# Patient Record
Sex: FEMALE | Race: WHITE | Employment: FULL TIME | URBAN - METROPOLITAN AREA
[De-identification: names, ages, dates, MRNs, and addresses within clinical notes are randomized per-mention and may not be internally consistent; named-entity substitution may affect disease eponyms.]

---

## 2017-07-18 PROBLEM — E03.9 HYPOTHYROIDISM: Status: ACTIVE | Noted: 2017-07-18

## 2018-07-06 ENCOUNTER — TELEPHONE (OUTPATIENT)
Dept: FAMILY MEDICINE CLINIC | Age: 48
End: 2018-07-06

## 2020-01-02 PROBLEM — Z98.890 POST-OPERATIVE STATE: Status: ACTIVE | Noted: 2020-01-02

## 2020-01-09 PROBLEM — Z48.89 POSTOPERATIVE VISIT: Status: ACTIVE | Noted: 2020-01-09

## 2020-02-01 PROBLEM — Z98.890 POST-OPERATIVE STATE: Status: RESOLVED | Noted: 2020-01-02 | Resolved: 2020-02-01

## 2020-02-04 PROBLEM — Z48.89 POSTOPERATIVE VISIT: Status: RESOLVED | Noted: 2020-01-09 | Resolved: 2020-02-04

## 2021-01-04 PROBLEM — G47.9 SLEEP TROUBLE: Status: ACTIVE | Noted: 2021-01-04

## 2022-08-16 PROBLEM — K58.9 IRRITABLE BOWEL SYNDROME: Status: ACTIVE | Noted: 2022-08-16

## 2022-11-03 ENCOUNTER — HOSPITAL ENCOUNTER (OUTPATIENT)
Dept: CT IMAGING | Facility: CLINIC | Age: 52
Discharge: HOME | End: 2022-11-05
Payer: COMMERCIAL

## 2022-11-03 ENCOUNTER — OFFICE VISIT (OUTPATIENT)
Dept: FAMILY MEDICINE CLINIC | Age: 52
End: 2022-11-03
Payer: COMMERCIAL

## 2022-11-03 ENCOUNTER — HOSPITAL ENCOUNTER (OUTPATIENT)
Facility: CLINIC | Age: 52
Discharge: HOME | End: 2022-11-03
Payer: COMMERCIAL

## 2022-11-03 VITALS
TEMPERATURE: 97.3 F | BODY MASS INDEX: 29.05 KG/M2 | WEIGHT: 164 LBS | OXYGEN SATURATION: 98 % | HEART RATE: 80 BPM | SYSTOLIC BLOOD PRESSURE: 122 MMHG | DIASTOLIC BLOOD PRESSURE: 80 MMHG

## 2022-11-03 DIAGNOSIS — R19.7 DIARRHEA, UNSPECIFIED TYPE: ICD-10-CM

## 2022-11-03 DIAGNOSIS — R10.32 LLQ PAIN: ICD-10-CM

## 2022-11-03 DIAGNOSIS — N20.0 KIDNEY STONE ON RIGHT SIDE: Primary | ICD-10-CM

## 2022-11-03 DIAGNOSIS — N20.0 KIDNEY STONE ON RIGHT SIDE: ICD-10-CM

## 2022-11-03 DIAGNOSIS — E03.9 HYPOTHYROIDISM, UNSPECIFIED TYPE: ICD-10-CM

## 2022-11-03 DIAGNOSIS — R10.32 LLQ PAIN: Primary | ICD-10-CM

## 2022-11-03 LAB
ABSOLUTE EOS #: 0 K/UL (ref 0–0.4)
ABSOLUTE LYMPH #: 1.1 K/UL (ref 1–4.8)
ABSOLUTE MONO #: 0.5 K/UL (ref 0.1–1.2)
ALBUMIN SERPL-MCNC: 5.2 G/DL (ref 3.5–5.2)
ALBUMIN/GLOBULIN RATIO: 2.1 (ref 1–2.5)
ALP BLD-CCNC: 57 U/L (ref 35–104)
ALT SERPL-CCNC: 15 U/L (ref 5–33)
ANION GAP SERPL CALCULATED.3IONS-SCNC: 12 MMOL/L (ref 9–17)
AST SERPL-CCNC: 25 U/L
BASOPHILS # BLD: 1 % (ref 0–2)
BASOPHILS ABSOLUTE: 0 K/UL (ref 0–0.2)
BILIRUB SERPL-MCNC: 0.5 MG/DL (ref 0.3–1.2)
BILIRUBIN URINE: NEGATIVE
BUN BLDV-MCNC: 10 MG/DL (ref 6–20)
CALCIUM SERPL-MCNC: 9.8 MG/DL (ref 8.6–10.4)
CHLORIDE BLD-SCNC: 99 MMOL/L (ref 98–107)
CO2: 27 MMOL/L (ref 20–31)
COLOR: YELLOW
CREAT SERPL-MCNC: 0.8 MG/DL (ref 0.5–0.9)
EOSINOPHILS RELATIVE PERCENT: 1 % (ref 1–4)
EPITHELIAL CELLS UA: ABNORMAL /HPF (ref 0–5)
GFR SERPL CREATININE-BSD FRML MDRD: >60 ML/MIN/1.73M2
GLUCOSE BLD-MCNC: 93 MG/DL (ref 70–99)
GLUCOSE URINE: NEGATIVE
HCT VFR BLD CALC: 41.6 % (ref 36–46)
HEMOGLOBIN: 14.2 G/DL (ref 12–16)
KETONES, URINE: ABNORMAL
LACTIC ACID: 1.1 MMOL/L (ref 0.5–2.2)
LEUKOCYTE ESTERASE, URINE: NEGATIVE
LYMPHOCYTES # BLD: 19 % (ref 24–44)
MCH RBC QN AUTO: 32.5 PG (ref 26–34)
MCHC RBC AUTO-ENTMCNC: 34.1 G/DL (ref 31–37)
MCV RBC AUTO: 95.3 FL (ref 80–100)
MONOCYTES # BLD: 9 % (ref 2–11)
NITRITE, URINE: NEGATIVE
PDW BLD-RTO: 12.3 % (ref 12.5–15.4)
PH UA: 8 (ref 5–8)
PLATELET # BLD: 308 K/UL (ref 140–450)
PMV BLD AUTO: 8.8 FL (ref 6–12)
POTASSIUM SERPL-SCNC: 4.4 MMOL/L (ref 3.7–5.3)
PROTEIN UA: NEGATIVE
RBC # BLD: 4.37 M/UL (ref 4–5.2)
RBC UA: ABNORMAL /HPF (ref 0–4)
SEG NEUTROPHILS: 70 % (ref 36–66)
SEGMENTED NEUTROPHILS ABSOLUTE COUNT: 4 K/UL (ref 1.8–7.7)
SODIUM BLD-SCNC: 138 MMOL/L (ref 135–144)
SPECIFIC GRAVITY UA: 1.06 (ref 1–1.03)
TOTAL PROTEIN: 7.7 G/DL (ref 6.4–8.3)
TSH SERPL DL<=0.05 MIU/L-ACNC: 2.99 UIU/ML (ref 0.3–5)
TURBIDITY: CLEAR
URINE HGB: NEGATIVE
UROBILINOGEN, URINE: NORMAL
WBC # BLD: 5.7 K/UL (ref 3.5–11)
WBC UA: ABNORMAL /HPF (ref 0–5)

## 2022-11-03 PROCEDURE — 80053 COMPREHEN METABOLIC PANEL: CPT

## 2022-11-03 PROCEDURE — 3074F SYST BP LT 130 MM HG: CPT

## 2022-11-03 PROCEDURE — 84443 ASSAY THYROID STIM HORMONE: CPT

## 2022-11-03 PROCEDURE — G8417 CALC BMI ABV UP PARAM F/U: HCPCS

## 2022-11-03 PROCEDURE — 6360000004 HC RX CONTRAST MEDICATION

## 2022-11-03 PROCEDURE — 83605 ASSAY OF LACTIC ACID: CPT

## 2022-11-03 PROCEDURE — 87086 URINE CULTURE/COLONY COUNT: CPT

## 2022-11-03 PROCEDURE — 3078F DIAST BP <80 MM HG: CPT

## 2022-11-03 PROCEDURE — 1036F TOBACCO NON-USER: CPT

## 2022-11-03 PROCEDURE — 85025 COMPLETE CBC W/AUTO DIFF WBC: CPT

## 2022-11-03 PROCEDURE — G8484 FLU IMMUNIZE NO ADMIN: HCPCS

## 2022-11-03 PROCEDURE — 36415 COLL VENOUS BLD VENIPUNCTURE: CPT

## 2022-11-03 PROCEDURE — 81001 URINALYSIS AUTO W/SCOPE: CPT

## 2022-11-03 PROCEDURE — 99213 OFFICE O/P EST LOW 20 MIN: CPT

## 2022-11-03 PROCEDURE — 74177 CT ABD & PELVIS W/CONTRAST: CPT

## 2022-11-03 PROCEDURE — G8427 DOCREV CUR MEDS BY ELIG CLIN: HCPCS

## 2022-11-03 PROCEDURE — 3017F COLORECTAL CA SCREEN DOC REV: CPT

## 2022-11-03 PROCEDURE — 2580000003 HC RX 258

## 2022-11-03 RX ORDER — TAMSULOSIN HYDROCHLORIDE 0.4 MG/1
0.4 CAPSULE ORAL DAILY
Qty: 30 CAPSULE | Refills: 0 | Status: SHIPPED | OUTPATIENT
Start: 2022-11-03

## 2022-11-03 RX ORDER — 0.9 % SODIUM CHLORIDE 0.9 %
80 INTRAVENOUS SOLUTION INTRAVENOUS ONCE
Status: COMPLETED | OUTPATIENT
Start: 2022-11-03 | End: 2022-11-03

## 2022-11-03 RX ORDER — SODIUM CHLORIDE 0.9 % (FLUSH) 0.9 %
10 SYRINGE (ML) INJECTION PRN
Status: DISCONTINUED | OUTPATIENT
Start: 2022-11-03 | End: 2022-11-06 | Stop reason: HOSPADM

## 2022-11-03 RX ADMIN — IOPAMIDOL 75 ML: 755 INJECTION, SOLUTION INTRAVENOUS at 12:07

## 2022-11-03 RX ADMIN — SODIUM CHLORIDE 75 ML: 9 INJECTION, SOLUTION INTRAVENOUS at 12:07

## 2022-11-03 RX ADMIN — SODIUM CHLORIDE, PRESERVATIVE FREE 10 ML: 5 INJECTION INTRAVENOUS at 12:07

## 2022-11-03 SDOH — ECONOMIC STABILITY: TRANSPORTATION INSECURITY
IN THE PAST 12 MONTHS, HAS THE LACK OF TRANSPORTATION KEPT YOU FROM MEDICAL APPOINTMENTS OR FROM GETTING MEDICATIONS?: NO

## 2022-11-03 SDOH — ECONOMIC STABILITY: FOOD INSECURITY: WITHIN THE PAST 12 MONTHS, THE FOOD YOU BOUGHT JUST DIDN'T LAST AND YOU DIDN'T HAVE MONEY TO GET MORE.: NEVER TRUE

## 2022-11-03 SDOH — ECONOMIC STABILITY: TRANSPORTATION INSECURITY
IN THE PAST 12 MONTHS, HAS LACK OF TRANSPORTATION KEPT YOU FROM MEETINGS, WORK, OR FROM GETTING THINGS NEEDED FOR DAILY LIVING?: NO

## 2022-11-03 SDOH — ECONOMIC STABILITY: FOOD INSECURITY: WITHIN THE PAST 12 MONTHS, YOU WORRIED THAT YOUR FOOD WOULD RUN OUT BEFORE YOU GOT MONEY TO BUY MORE.: NEVER TRUE

## 2022-11-03 ASSESSMENT — PATIENT HEALTH QUESTIONNAIRE - PHQ9
5. POOR APPETITE OR OVEREATING: 0
8. MOVING OR SPEAKING SO SLOWLY THAT OTHER PEOPLE COULD HAVE NOTICED. OR THE OPPOSITE, BEING SO FIGETY OR RESTLESS THAT YOU HAVE BEEN MOVING AROUND A LOT MORE THAN USUAL: 0
3. TROUBLE FALLING OR STAYING ASLEEP: 0
SUM OF ALL RESPONSES TO PHQ9 QUESTIONS 1 & 2: 0
2. FEELING DOWN, DEPRESSED OR HOPELESS: 0
SUM OF ALL RESPONSES TO PHQ QUESTIONS 1-9: 0
9. THOUGHTS THAT YOU WOULD BE BETTER OFF DEAD, OR OF HURTING YOURSELF: 0
1. LITTLE INTEREST OR PLEASURE IN DOING THINGS: 0
4. FEELING TIRED OR HAVING LITTLE ENERGY: 0
SUM OF ALL RESPONSES TO PHQ QUESTIONS 1-9: 0
7. TROUBLE CONCENTRATING ON THINGS, SUCH AS READING THE NEWSPAPER OR WATCHING TELEVISION: 0
10. IF YOU CHECKED OFF ANY PROBLEMS, HOW DIFFICULT HAVE THESE PROBLEMS MADE IT FOR YOU TO DO YOUR WORK, TAKE CARE OF THINGS AT HOME, OR GET ALONG WITH OTHER PEOPLE: 0
6. FEELING BAD ABOUT YOURSELF - OR THAT YOU ARE A FAILURE OR HAVE LET YOURSELF OR YOUR FAMILY DOWN: 0

## 2022-11-03 ASSESSMENT — ENCOUNTER SYMPTOMS
NAUSEA: 0
RECTAL PAIN: 0
DIARRHEA: 1
COUGH: 0
SORE THROAT: 0
WHEEZING: 0
CHEST TIGHTNESS: 0
BLOOD IN STOOL: 0
VOMITING: 0
CONSTIPATION: 0
SHORTNESS OF BREATH: 0
ABDOMINAL DISTENTION: 0
EYE DISCHARGE: 0
ABDOMINAL PAIN: 1
COLOR CHANGE: 0

## 2022-11-03 ASSESSMENT — SOCIAL DETERMINANTS OF HEALTH (SDOH): HOW HARD IS IT FOR YOU TO PAY FOR THE VERY BASICS LIKE FOOD, HOUSING, MEDICAL CARE, AND HEATING?: NOT HARD AT ALL

## 2022-11-03 NOTE — PROGRESS NOTES
Hortencia Truong (:  1970) is a 46 y.o. female,Established patient, here for evaluation of the following chief complaint(s):  Diarrhea          Subjective   SUBJECTIVE/OBJECTIVE:  Pt here today for acute c/o diarrhea/increased stool frequency x1 week  VSS, she does not appear in acute distress    She typically will have once daily BM and some mild intermittent abdominal cramping that she takes Bentyl for PRN which helps. She has been having 3-4 BM per day, waking up to go in the middle of the night. They are soft in nature w/ intermittent diarrhea, denies black or tarry stool. She is starting to have generalized abdominal discomfort, states it as a dull ache, no nausea but has reduced appetite. She had one episode of vomiting 3 days ago but none since. She is able to tolerate PO fluids, denies fevers/chills. Past surgical hx of hysterectomy years ago, no other abdominal surgeries. Pt is tender to LLQ on exam, never dx w/ diverticulosis but states she has had episodes of acute abdominal discomfort after eating raspberries or things w/ seeds and assumed this may be the case so she avoids now. Review of Systems   Constitutional:  Positive for appetite change. Negative for activity change, chills, diaphoresis, fatigue, fever and unexpected weight change. HENT:  Negative for congestion, ear pain and sore throat. Eyes:  Negative for discharge and visual disturbance. Respiratory:  Negative for cough, chest tightness, shortness of breath and wheezing. Cardiovascular:  Negative for chest pain, palpitations and leg swelling. Gastrointestinal:  Positive for abdominal pain and diarrhea. Negative for abdominal distention, blood in stool, constipation, nausea, rectal pain and vomiting. Genitourinary:  Negative for difficulty urinating, dysuria, frequency, pelvic pain and urgency. Musculoskeletal:  Negative for gait problem and neck pain. Skin:  Negative for color change, rash and wound. Neurological:  Negative for dizziness, seizures, syncope, weakness, light-headedness, numbness and headaches. Psychiatric/Behavioral:  Negative for behavioral problems and confusion. Objective   Physical Exam  Vitals and nursing note reviewed. Constitutional:       General: She is not in acute distress. Appearance: Normal appearance. She is well-developed. She is not ill-appearing, toxic-appearing or diaphoretic. HENT:      Head: Normocephalic and atraumatic. Right Ear: External ear normal.      Left Ear: External ear normal.      Nose: Nose normal.   Eyes:      General: No scleral icterus. Right eye: No discharge. Left eye: No discharge. Conjunctiva/sclera: Conjunctivae normal.      Pupils: Pupils are equal, round, and reactive to light. Neck:      Trachea: No tracheal deviation. Cardiovascular:      Rate and Rhythm: Normal rate and regular rhythm. Heart sounds: Normal heart sounds. No murmur heard. No friction rub. No gallop. Pulmonary:      Effort: Pulmonary effort is normal. No tachypnea, accessory muscle usage or respiratory distress. Breath sounds: Normal breath sounds. No stridor. No decreased breath sounds, wheezing, rhonchi or rales. Abdominal:      General: Abdomen is flat. Bowel sounds are normal. There is no distension. Palpations: Abdomen is soft. There is no mass. Tenderness: There is abdominal tenderness (LLQ). There is no right CVA tenderness, left CVA tenderness, guarding or rebound. Hernia: No hernia is present. Musculoskeletal:         General: No tenderness or deformity. Normal range of motion. Cervical back: Normal range of motion and neck supple. Skin:     General: Skin is warm and dry. Coloration: Skin is not pale. Findings: No erythema or rash. Neurological:      Mental Status: She is alert and oriented to person, place, and time. GCS: GCS eye subscore is 4. GCS verbal subscore is 5.  GCS motor subscore is 6. Gait: Gait is intact. Gait normal.   Psychiatric:         Speech: Speech normal.         Behavior: Behavior normal.         Thought Content: Thought content normal.         Judgment: Judgment normal.               ASSESSMENT/PLAN:  1. LLQ pain  2. Diarrhea, unspecified type  -STAT labs/CT to r/o diverticulitis/colitis  -discussed red flags s/s w/ pt    -     CBC with Auto Differential; Future  -     Comprehensive Metabolic Panel; Future  -     Lactic Acid; Future  -     CT ABDOMEN PELVIS W IV CONTRAST Additional Contrast? None; Future      Return if symptoms worsen or fail to improve. An electronic signature was used to authenticate this note.     --William Mejia, APRN - CNP

## 2022-11-03 NOTE — PROGRESS NOTES
Pt initially presented w/ sx concerning for diverticulitis. STAT CT shows incidental finding of 7mm stone in right UPJ w/ mild right hydro, kidney function normal. Small right renal cysts also noted. Will collect UA/UC, refer pt to urology and start flomax. Since she is not in acute pain related to the stone will encourage otc pain control.

## 2022-11-04 LAB
CULTURE: NORMAL
SPECIMEN DESCRIPTION: NORMAL

## 2022-11-07 ENCOUNTER — TELEPHONE (OUTPATIENT)
Dept: FAMILY MEDICINE CLINIC | Age: 52
End: 2022-11-07

## 2022-11-07 DIAGNOSIS — N20.0 KIDNEY STONE ON RIGHT SIDE: ICD-10-CM

## 2022-11-07 DIAGNOSIS — N20.0 KIDNEY STONE ON RIGHT SIDE: Primary | ICD-10-CM

## 2022-11-07 RX ORDER — ONDANSETRON 4 MG/1
4 TABLET, FILM COATED ORAL EVERY 8 HOURS PRN
Qty: 30 TABLET | Refills: 0 | Status: SHIPPED | OUTPATIENT
Start: 2022-11-07

## 2022-11-07 RX ORDER — ONDANSETRON 4 MG/1
4 TABLET, FILM COATED ORAL EVERY 8 HOURS PRN
Qty: 30 TABLET | Refills: 0 | Status: SHIPPED | OUTPATIENT
Start: 2022-11-07 | End: 2022-11-07 | Stop reason: SDUPTHER

## 2022-11-07 RX ORDER — HYDROCODONE BITARTRATE AND ACETAMINOPHEN 5; 325 MG/1; MG/1
1 TABLET ORAL EVERY 4 HOURS PRN
Qty: 42 TABLET | Refills: 0 | Status: SHIPPED | OUTPATIENT
Start: 2022-11-07 | End: 2022-11-07 | Stop reason: SDUPTHER

## 2022-11-07 RX ORDER — HYDROCODONE BITARTRATE AND ACETAMINOPHEN 5; 325 MG/1; MG/1
1 TABLET ORAL EVERY 4 HOURS PRN
Qty: 42 TABLET | Refills: 0 | Status: SHIPPED | OUTPATIENT
Start: 2022-11-07 | End: 2022-11-14

## 2022-11-07 NOTE — TELEPHONE ENCOUNTER
Patient was  this summer. 's license has been changed but she is still waiting for her ins to be changed. Her last is [place]. Still [place] on ins.

## 2022-11-07 NOTE — TELEPHONE ENCOUNTER
Patient having more pain and ibuprofen and tylenol are not helping. Pain is mainly at night and first thing in the morning. Please advise.  Urology appointment 12/06/2022

## 2022-11-07 NOTE — TELEPHONE ENCOUNTER
Pharmacy called stating that patient's last name is BEMARIJA and in our system it is [id number]. D.L and Insurance cards all say [address] So patient's medication can not be processed with insurance until it is changed in our system and sent over. Writer changed patient;s last name in [address], please resend medication over to pharmacy so that it can be processed. Please advise, any questions please contact pharmacy.

## 2022-12-03 ENCOUNTER — HOSPITAL ENCOUNTER (OUTPATIENT)
Dept: MAMMOGRAPHY | Age: 52
Discharge: HOME | End: 2022-12-03
Payer: COMMERCIAL

## 2022-12-03 DIAGNOSIS — Z12.31 ENCOUNTER FOR SCREENING MAMMOGRAM FOR BREAST CANCER: ICD-10-CM

## 2022-12-03 PROCEDURE — 77063 BREAST TOMOSYNTHESIS BI: CPT

## 2022-12-06 ENCOUNTER — HOSPITAL ENCOUNTER (OUTPATIENT)
Dept: ULTRASOUND IMAGING | Age: 52
Discharge: HOME | End: 2022-12-08
Payer: COMMERCIAL

## 2022-12-06 ENCOUNTER — HOSPITAL ENCOUNTER (OUTPATIENT)
Dept: GENERAL RADIOLOGY | Age: 52
Discharge: HOME | End: 2022-12-08
Payer: COMMERCIAL

## 2022-12-06 ENCOUNTER — HOSPITAL ENCOUNTER (OUTPATIENT)
Age: 52
Discharge: HOME | End: 2022-12-08
Payer: COMMERCIAL

## 2022-12-06 DIAGNOSIS — R92.8 ABNORMAL MAMMOGRAM OF LEFT BREAST: Primary | ICD-10-CM

## 2022-12-06 DIAGNOSIS — N20.0 KIDNEY STONE: ICD-10-CM

## 2022-12-06 DIAGNOSIS — N20.0 KIDNEY STONES: ICD-10-CM

## 2022-12-06 DIAGNOSIS — N63.20 MASS OF LEFT BREAST, UNSPECIFIED QUADRANT: ICD-10-CM

## 2022-12-06 PROCEDURE — 76775 US EXAM ABDO BACK WALL LIM: CPT

## 2022-12-06 PROCEDURE — 74018 RADEX ABDOMEN 1 VIEW: CPT

## 2022-12-12 ENCOUNTER — HOSPITAL ENCOUNTER (EMERGENCY)
Age: 52
Discharge: HOME | End: 2022-12-12
Attending: EMERGENCY MEDICINE
Payer: COMMERCIAL

## 2022-12-12 ENCOUNTER — APPOINTMENT (OUTPATIENT)
Dept: CT IMAGING | Age: 52
End: 2022-12-12
Payer: COMMERCIAL

## 2022-12-12 VITALS
DIASTOLIC BLOOD PRESSURE: 78 MMHG | SYSTOLIC BLOOD PRESSURE: 147 MMHG | TEMPERATURE: 98.4 F | HEART RATE: 82 BPM | RESPIRATION RATE: 16 BRPM | OXYGEN SATURATION: 99 %

## 2022-12-12 DIAGNOSIS — R10.9 FLANK PAIN: Primary | ICD-10-CM

## 2022-12-12 DIAGNOSIS — N20.0 KIDNEY STONE: ICD-10-CM

## 2022-12-12 LAB
ABSOLUTE EOS #: 0.15 K/UL (ref 0–0.44)
ABSOLUTE IMMATURE GRANULOCYTE: 0.04 K/UL (ref 0–0.3)
ABSOLUTE LYMPH #: 0.99 K/UL (ref 1.1–3.7)
ABSOLUTE MONO #: 0.56 K/UL (ref 0.1–1.2)
AMORPHOUS: ABNORMAL
ANION GAP SERPL CALCULATED.3IONS-SCNC: 13 MMOL/L (ref 9–17)
BACTERIA: ABNORMAL
BASOPHILS # BLD: 0 % (ref 0–2)
BASOPHILS ABSOLUTE: 0.03 K/UL (ref 0–0.2)
BILIRUBIN URINE: ABNORMAL
BUN BLDV-MCNC: 10 MG/DL (ref 6–20)
BUN/CREAT BLD: 11 (ref 9–20)
CALCIUM SERPL-MCNC: 9.7 MG/DL (ref 8.6–10.4)
CHLORIDE BLD-SCNC: 101 MMOL/L (ref 98–107)
CO2: 24 MMOL/L (ref 20–31)
COLOR: ABNORMAL
CREAT SERPL-MCNC: 0.91 MG/DL (ref 0.5–0.9)
EOSINOPHILS RELATIVE PERCENT: 2 % (ref 1–4)
EPITHELIAL CELLS UA: ABNORMAL /HPF (ref 0–5)
GFR SERPL CREATININE-BSD FRML MDRD: >60 ML/MIN/1.73M2
GLUCOSE BLD-MCNC: 111 MG/DL (ref 70–99)
GLUCOSE URINE: NEGATIVE
HCT VFR BLD CALC: 41.5 % (ref 36.3–47.1)
HEMOGLOBIN: 13.8 G/DL (ref 11.9–15.1)
IMMATURE GRANULOCYTES: 1 %
KETONES, URINE: ABNORMAL
LEUKOCYTE ESTERASE, URINE: ABNORMAL
LYMPHOCYTES # BLD: 12 % (ref 24–43)
MCH RBC QN AUTO: 32.2 PG (ref 25.2–33.5)
MCHC RBC AUTO-ENTMCNC: 33.3 G/DL (ref 28.4–34.8)
MCV RBC AUTO: 97 FL (ref 82.6–102.9)
MONOCYTES # BLD: 7 % (ref 3–12)
NITRITE, URINE: POSITIVE
NRBC AUTOMATED: 0 PER 100 WBC
OTHER OBSERVATIONS UA: ABNORMAL
PDW BLD-RTO: 11.9 % (ref 11.8–14.4)
PH UA: 5 (ref 5–8)
PLATELET # BLD: 258 K/UL (ref 138–453)
PMV BLD AUTO: 10.3 FL (ref 8.1–13.5)
POTASSIUM SERPL-SCNC: 4.1 MMOL/L (ref 3.7–5.3)
PROTEIN UA: ABNORMAL
RBC # BLD: 4.28 M/UL (ref 3.95–5.11)
RBC UA: ABNORMAL /HPF (ref 0–2)
SEG NEUTROPHILS: 78 % (ref 36–65)
SEGMENTED NEUTROPHILS ABSOLUTE COUNT: 6.82 K/UL (ref 1.5–8.1)
SODIUM BLD-SCNC: 138 MMOL/L (ref 135–144)
SPECIFIC GRAVITY UA: 1.04 (ref 1–1.03)
TURBIDITY: ABNORMAL
URINE HGB: ABNORMAL
UROBILINOGEN, URINE: NORMAL
WBC # BLD: 8.6 K/UL (ref 3.5–11.3)
WBC UA: ABNORMAL /HPF (ref 0–5)

## 2022-12-12 PROCEDURE — 86403 PARTICLE AGGLUT ANTBDY SCRN: CPT

## 2022-12-12 PROCEDURE — 6360000002 HC RX W HCPCS: Performed by: EMERGENCY MEDICINE

## 2022-12-12 PROCEDURE — 96361 HYDRATE IV INFUSION ADD-ON: CPT

## 2022-12-12 PROCEDURE — 85025 COMPLETE CBC W/AUTO DIFF WBC: CPT

## 2022-12-12 PROCEDURE — 2580000003 HC RX 258: Performed by: EMERGENCY MEDICINE

## 2022-12-12 PROCEDURE — 87086 URINE CULTURE/COLONY COUNT: CPT

## 2022-12-12 PROCEDURE — 96374 THER/PROPH/DIAG INJ IV PUSH: CPT

## 2022-12-12 PROCEDURE — 6370000000 HC RX 637 (ALT 250 FOR IP): Performed by: EMERGENCY MEDICINE

## 2022-12-12 PROCEDURE — 81001 URINALYSIS AUTO W/SCOPE: CPT

## 2022-12-12 PROCEDURE — 99284 EMERGENCY DEPT VISIT MOD MDM: CPT

## 2022-12-12 PROCEDURE — 96375 TX/PRO/DX INJ NEW DRUG ADDON: CPT

## 2022-12-12 PROCEDURE — 80048 BASIC METABOLIC PNL TOTAL CA: CPT

## 2022-12-12 PROCEDURE — 74176 CT ABD & PELVIS W/O CONTRAST: CPT

## 2022-12-12 RX ORDER — ONDANSETRON 2 MG/ML
4 INJECTION INTRAMUSCULAR; INTRAVENOUS ONCE
Status: COMPLETED | OUTPATIENT
Start: 2022-12-12 | End: 2022-12-12

## 2022-12-12 RX ORDER — CEPHALEXIN 500 MG/1
500 CAPSULE ORAL ONCE
Status: COMPLETED | OUTPATIENT
Start: 2022-12-12 | End: 2022-12-12

## 2022-12-12 RX ORDER — MORPHINE SULFATE 4 MG/ML
4 INJECTION, SOLUTION INTRAMUSCULAR; INTRAVENOUS ONCE
Status: COMPLETED | OUTPATIENT
Start: 2022-12-12 | End: 2022-12-12

## 2022-12-12 RX ORDER — CEPHALEXIN 500 MG/1
500 CAPSULE ORAL 2 TIMES DAILY
Qty: 14 CAPSULE | Refills: 0 | Status: SHIPPED | OUTPATIENT
Start: 2022-12-12 | End: 2022-12-19

## 2022-12-12 RX ORDER — 0.9 % SODIUM CHLORIDE 0.9 %
500 INTRAVENOUS SOLUTION INTRAVENOUS ONCE
Status: COMPLETED | OUTPATIENT
Start: 2022-12-12 | End: 2022-12-12

## 2022-12-12 RX ADMIN — ONDANSETRON 4 MG: 2 INJECTION INTRAMUSCULAR; INTRAVENOUS at 04:15

## 2022-12-12 RX ADMIN — MORPHINE SULFATE 4 MG: 4 INJECTION, SOLUTION INTRAMUSCULAR; INTRAVENOUS at 04:16

## 2022-12-12 RX ADMIN — SODIUM CHLORIDE 500 ML: 9 INJECTION, SOLUTION INTRAVENOUS at 04:15

## 2022-12-12 RX ADMIN — CEPHALEXIN 500 MG: 500 CAPSULE ORAL at 05:42

## 2022-12-12 ASSESSMENT — ENCOUNTER SYMPTOMS
SHORTNESS OF BREATH: 0
EYES NEGATIVE: 1
APNEA: 0
COLOR CHANGE: 0
NAUSEA: 1
CONSTIPATION: 0
CHEST TIGHTNESS: 0
SINUS PAIN: 0
VOMITING: 0
DIARRHEA: 0
ABDOMINAL DISTENTION: 0

## 2022-12-12 NOTE — DISCHARGE INSTRUCTIONS
As we discussed you do have stones up inside the kidneys. You do not have any obstructing stones in your ureters. You do have some evidence of urinary infection on your urine here plan is to treat for UTI. Given that you did recently have a ureteral stone and had significant change in pain tonight with blood in the urine I suspect she may have passed the stone very recently. I recommend follow-up with your urologist at your upcoming appointment.

## 2022-12-12 NOTE — ED PROVIDER NOTES
EMERGENCY DEPARTMENT ENCOUNTER    Pt Name: Mary Escoto  MRN: [id number]  [place] 1970  Date of evaluation: 12/12/22  CHIEF COMPLAINT       Chief Complaint   Patient presents with    Flank Pain     HISTORY OF PRESENT ILLNESS   55-year-old female presents emergency room for right flank pain. She was having some intermittent flank pain back in November and had a CT abdomen pelvis showing a right UPJ stone. She has followed up with nephrology. She is awaiting follow-up next week. Pain became a lot worse overnight. She is having blood in the urine which she did not have before. She reports some associated nausea. Patient's other medical history includes IBS hypertension hyperlipidemia anxiety and hypothyroidism. REVIEW OF SYSTEMS     Review of Systems   Constitutional:  Negative for activity change, chills and diaphoresis. HENT:  Negative for congestion, sinus pain and tinnitus. Eyes: Negative. Respiratory:  Negative for apnea, chest tightness and shortness of breath. Gastrointestinal:  Positive for nausea. Negative for abdominal distention, constipation, diarrhea and vomiting. Genitourinary:  Positive for flank pain. Negative for difficulty urinating and frequency. Musculoskeletal:  Negative for arthralgias and myalgias. Skin:  Negative for color change and rash. Neurological:  Negative for dizziness. Hematological: Negative. Psychiatric/Behavioral: Negative. PASTMEDICAL HISTORY     Past Medical History:   Diagnosis Date    Cough     phlegm    Hyperlipidemia     Hypertension     Hypothyroid     Menorrhagia     Seasonal allergies     Sinus headache     Uterine fibroid     Wears glasses      Past Problem List  Patient Active Problem List   Diagnosis Code    Hypertension I10    Hyperlipemia E78.5    Anxiety F41.9    Depression F32. A    Hypothyroidism E03.9    Sleep trouble G47.9    Irritable bowel syndrome K58.9     SURGICAL HISTORY       Past Surgical History: Procedure Laterality Date     SECTION      DILATION AND CURETTAGE OF UTERUS N/A 2019    DILATATION AND CURETTAGE HYSTEROSCOPY performed by Eden De Anda MD at [facility] N/A 2020    HYSTERECTOMY VAGINAL LAPAROSCOPIC ASSISTED (LAVH) WITH CYSTO AND LYSIS OF ADHESIONS performed by Eden De Anda MD at [address] LEFT Left 2021    US BREAST NEEDLE BIOPSY LEFT 2021 [facility] ULTRASOUND    WISDOM TOOTH EXTRACTION       CURRENT MEDICATIONS       Discharge Medication List as of 2022  5:33 AM        CONTINUE these medications which have NOT CHANGED    Details   ondansetron (ZOFRAN) 4 MG tablet Take 1 tablet by mouth every 8 hours as needed for Nausea or Vomiting, Disp-30 tablet, R-0Normal      tamsulosin (FLOMAX) 0.4 MG capsule Take 1 capsule by mouth daily, Disp-30 capsule, R-0Normal      levothyroxine (SYNTHROID) 100 MCG tablet Take 1 tablet by mouth in the morning., Disp-90 tablet, R-1Normal      dicyclomine (BENTYL) 20 MG tablet Take 1 tablet by mouth every 6 hours as needed (cramping), Disp-180 tablet, R-1Normal      simvastatin (ZOCOR) 40 MG tablet TAKE 1 TABLET DAILY WITH BREAKFAST, Disp-90 tablet, R-3Normal      traZODone (DESYREL) 150 MG tablet TAKE ONE-HALF (1/2) TABLET NIGHTLY, Disp-90 tablet, R-3Normal      lisinopril-hydroCHLOROthiazide (PRINZIDE;ZESTORETIC) 10-12.5 MG per tablet Take 1 tablet by mouth daily, Disp-90 tablet, R-3Normal      fluticasone (FLONASE) 50 MCG/ACT nasal spray USE 2 SPRAYS NASALLY DAILY, Disp-48 g, R-3Normal      diphenhydrAMINE (BENADRYL) 25 MG capsule Take 25 mg by mouth every 6 hours as needed for ItchingHistorical Med      Loratadine (CLARITIN PO) Take 10 mg by mouth daily Historical Med           ALLERGIES     is allergic to avelox [moxifloxacin hcl in nacl], codeine, and sulfa antibiotics. FAMILY HISTORY     She indicated that the status of her mother is unknown.  She indicated that the status of her maternal grandmother is unknown. She indicated that the status of her paternal grandmother is unknown. SOCIAL HISTORY       Social History     Tobacco Use    Smoking status: Never    Smokeless tobacco: Never   Vaping Use    Vaping Use: Never used   Substance Use Topics    Alcohol use: Yes     Alcohol/week: 0.0 standard drinks     Comment: 2 drinks a week    Drug use: No     PHYSICAL EXAM     INITIAL VITALS: BP (!) 147/78   Pulse 82   Temp 98.4 °F (36.9 °C) (Oral)   Resp 16   LMP 2019 (Exact Date)   SpO2 99%    Physical Exam  Constitutional:       General: She is not in acute distress. Appearance: She is well-developed. HENT:      Head: Normocephalic. Eyes:      Pupils: Pupils are equal, round, and reactive to light. Cardiovascular:      Rate and Rhythm: Normal rate and regular rhythm. Heart sounds: Normal heart sounds. Pulmonary:      Effort: Pulmonary effort is normal. No respiratory distress. Breath sounds: Normal breath sounds. Abdominal:      General: Bowel sounds are normal.      Palpations: Abdomen is soft. Tenderness: There is no abdominal tenderness. There is right CVA tenderness. Musculoskeletal:         General: Normal range of motion. Skin:     General: Skin is warm and dry. Neurological:      Mental Status: She is alert and oriented to person, place, and time. MEDICAL DECISION MAKIN-year-old female presenting to the emergency room with right-sided flank pain with radiation into the abdomen with recent diagnosis of something millimeter UPJ stone on CT from 11/3/2022 without known resolution of the stone. Patient given pain medication here in the ED. Pain improved substantially. CT abdomen pelvis today shows bilateral kidney stones without stone in the ureters or evidence of hydronephrosis. Given lack of visible stone in the ureter causing obstruction with resolution of pain I feel patient can be clinically discharged.   She does have appropriate urology follow-up. CRITICAL CARE:       PROCEDURES:    Procedures    DIAGNOSTIC RESULTS   EKG:All EKG's are interpreted by the Emergency Department Physician who either signs or Co-signs this chart in the absence of a cardiologist.        RADIOLOGY:All plain film, CT, MRI, and formal ultrasound images (except ED bedside ultrasound) are read by the radiologist, see reports below, unless otherwisenoted in MDM or here. CT ABDOMEN PELVIS WO CONTRAST Additional Contrast? None   Final Result   1. Bilateral nephrolithiasis without obstruction or ureteral calculi. 2. Benign-appearing simple round cyst in the right ovary measuring 2.6 cm. No follow-up felt necessary, reference below. RECOMMENDATIONS:   2.6 cm right ovarian simple-appearing cyst. No follow-up imaging is   recommended. Reference: JACR 2020 Feb;17(2):248-254              LABS: All lab results were reviewed by myself, and all abnormals are listed below. Labs Reviewed   CULTURE, URINE - Abnormal; Notable for the following components:       Result Value    Culture   (*)     Value: STREPTOCOCCI, BETA HEMOLYTIC GROUP B 10 to 50,000 CFU/ML    All other components within normal limits   URINALYSIS WITH REFLEX TO CULTURE - Abnormal; Notable for the following components:    Color, UA Red (*)     Turbidity UA Turbid (*)     Bilirubin Urine   (*)     Value: Presumptive positive. Unable to confirm due to unavailability of reagent.     Ketones, Urine 1+ (*)     Specific Gravity, UA 1.040 (*)     Urine Hgb 3+ (*)     Protein, UA 3+ (*)     Nitrite, Urine POSITIVE (*)     Leukocyte Esterase, Urine TRACE (*)     All other components within normal limits   CBC WITH AUTO DIFFERENTIAL - Abnormal; Notable for the following components:    Seg Neutrophils 78 (*)     Lymphocytes 12 (*)     Immature Granulocytes 1 (*)     Absolute Lymph # 0.99 (*)     All other components within normal limits   BASIC METABOLIC PANEL - Abnormal; Notable for the following components:    Glucose 111 (*)     Creatinine 0.91 (*)     All other components within normal limits   MICROSCOPIC URINALYSIS - Abnormal; Notable for the following components:    Bacteria, UA MODERATE (*)     Amorphous, UA 1+ (*)     Other Observations UA   (*)     Value: Microscopic performed on uncentrifuged urine -  Quantity Not Sufficient    All other components within normal limits       EMERGENCY DEPARTMENTCOURSE:         Vitals:    Vitals:    12/12/22 0326   BP: (!) 147/78   Pulse: 82   Resp: 16   Temp: 98.4 °F (36.9 °C)   TempSrc: Oral   SpO2: 99%       The patient was given the following medications while in the emergency department:  Orders Placed This Encounter   Medications    ondansetron (ZOFRAN) injection 4 mg    morphine sulfate (PF) injection 4 mg    0.9 % sodium chloride bolus    cephALEXin (KEFLEX) 500 MG capsule     Sig: Take 1 capsule by mouth 2 times daily for 7 days     Dispense:  14 capsule     Refill:  0    cephALEXin (KEFLEX) capsule 500 mg     Order Specific Question:   Antimicrobial Indications     Answer:   Urinary Tract Infection     CONSULTS:  None    FINAL IMPRESSION      1. Flank pain    2. Kidney stone          DISPOSITION/PLAN   DISPOSITION Decision To Discharge 12/12/2022 05:30:33 AM      PATIENT REFERRED TO:  Alice Eden MD  [address] [facility], [address]  [phone]      Tuesday  DISCHARGE MEDICATIONS:  Discharge Medication List as of 12/12/2022  5:33 AM        START taking these medications    Details   cephALEXin (KEFLEX) 500 MG capsule Take 1 capsule by mouth 2 times daily for 7 days, Disp-14 capsule, R-0Normal           Lizbeth Castro MD  Attending Emergency Physician      Care during this encounter was due to an unprecedented national emergency due to COVID-19.              Florence Quach MD  12/20/22 7169

## 2022-12-13 LAB
CULTURE: ABNORMAL
CULTURE: ABNORMAL
SPECIMEN DESCRIPTION: ABNORMAL

## 2023-01-03 DIAGNOSIS — N20.0 KIDNEY STONE ON RIGHT SIDE: ICD-10-CM

## 2023-01-03 RX ORDER — FLUTICASONE PROPIONATE 50 MCG
SPRAY, SUSPENSION (ML) NASAL
Qty: 48 G | Refills: 3 | Status: SHIPPED | OUTPATIENT
Start: 2023-01-03

## 2023-01-03 NOTE — TELEPHONE ENCOUNTER
Shashi Post is calling to request a refill on the following medication(s):    Medication Request:  Requested Prescriptions     Pending Prescriptions Disp Refills    fluticasone (FLONASE) 50 MCG/ACT nasal spray [Pharmacy Med Name: FLUTICASONE PROP NASAL SPRAY 16GM 50MCG] 48 g 3     Sig: USE 2 SPRAYS NASALLY DAILY       Last Visit Date (If Applicable):  8/85/4978    Next Visit Date:    3/3/2023

## 2023-01-05 RX ORDER — TAMSULOSIN HYDROCHLORIDE 0.4 MG/1
0.4 CAPSULE ORAL DAILY
Qty: 30 CAPSULE | Refills: 0 | Status: SHIPPED | OUTPATIENT
Start: 2023-01-05

## 2023-01-05 NOTE — TELEPHONE ENCOUNTER
Sherryle Falter is calling to request a refill on the following medication(s):    Last Visit Date (If Applicable):  91/8/6610    Next Visit Date:    Visit date not found    Medication Request:  Requested Prescriptions     Pending Prescriptions Disp Refills    tamsulosin (FLOMAX) 0.4 MG capsule [Pharmacy Med Name: TAMSULOSIN 0.4MG CAPSULES] 30 capsule 0     Sig: TAKE 1 CAPSULE BY MOUTH DAILY

## 2023-01-06 ENCOUNTER — HOSPITAL ENCOUNTER (OUTPATIENT)
Dept: ULTRASOUND IMAGING | Age: 53
End: 2023-01-06
Payer: COMMERCIAL

## 2023-01-06 ENCOUNTER — HOSPITAL ENCOUNTER (OUTPATIENT)
Dept: MAMMOGRAPHY | Age: 53
End: 2023-01-06
Payer: COMMERCIAL

## 2023-01-06 DIAGNOSIS — N63.20 MASS OF LEFT BREAST, UNSPECIFIED QUADRANT: ICD-10-CM

## 2023-01-06 DIAGNOSIS — R92.8 ABNORMAL MAMMOGRAM OF LEFT BREAST: ICD-10-CM

## 2023-01-06 PROCEDURE — 76642 ULTRASOUND BREAST LIMITED: CPT

## 2023-01-06 PROCEDURE — G0279 TOMOSYNTHESIS, MAMMO: HCPCS

## 2023-01-22 ENCOUNTER — OFFICE VISIT (OUTPATIENT)
Dept: PRIMARY CARE CLINIC | Age: 53
End: 2023-01-22
Payer: COMMERCIAL

## 2023-01-22 VITALS
HEIGHT: 63 IN | BODY MASS INDEX: 29.06 KG/M2 | OXYGEN SATURATION: 100 % | WEIGHT: 164 LBS | SYSTOLIC BLOOD PRESSURE: 139 MMHG | HEART RATE: 87 BPM | DIASTOLIC BLOOD PRESSURE: 80 MMHG

## 2023-01-22 DIAGNOSIS — H02.846 EYELID GLAND SWELLING, LEFT: Primary | ICD-10-CM

## 2023-01-22 PROCEDURE — 3075F SYST BP GE 130 - 139MM HG: CPT | Performed by: NURSE PRACTITIONER

## 2023-01-22 PROCEDURE — 99213 OFFICE O/P EST LOW 20 MIN: CPT | Performed by: NURSE PRACTITIONER

## 2023-01-22 PROCEDURE — G8484 FLU IMMUNIZE NO ADMIN: HCPCS | Performed by: NURSE PRACTITIONER

## 2023-01-22 PROCEDURE — G8427 DOCREV CUR MEDS BY ELIG CLIN: HCPCS | Performed by: NURSE PRACTITIONER

## 2023-01-22 PROCEDURE — G8417 CALC BMI ABV UP PARAM F/U: HCPCS | Performed by: NURSE PRACTITIONER

## 2023-01-22 PROCEDURE — 3017F COLORECTAL CA SCREEN DOC REV: CPT | Performed by: NURSE PRACTITIONER

## 2023-01-22 PROCEDURE — 1036F TOBACCO NON-USER: CPT | Performed by: NURSE PRACTITIONER

## 2023-01-22 PROCEDURE — 3079F DIAST BP 80-89 MM HG: CPT | Performed by: NURSE PRACTITIONER

## 2023-01-22 RX ORDER — POLYMYXIN B SULFATE AND TRIMETHOPRIM 1; 10000 MG/ML; [USP'U]/ML
1 SOLUTION OPHTHALMIC 4 TIMES DAILY
Qty: 10 ML | Refills: 0 | Status: SHIPPED | OUTPATIENT
Start: 2023-01-22 | End: 2023-02-01

## 2023-01-22 RX ORDER — PREDNISONE 20 MG/1
40 TABLET ORAL DAILY
Qty: 10 TABLET | Refills: 0 | Status: SHIPPED | OUTPATIENT
Start: 2023-01-22 | End: 2023-01-27

## 2023-01-22 ASSESSMENT — PATIENT HEALTH QUESTIONNAIRE - PHQ9
10. IF YOU CHECKED OFF ANY PROBLEMS, HOW DIFFICULT HAVE THESE PROBLEMS MADE IT FOR YOU TO DO YOUR WORK, TAKE CARE OF THINGS AT HOME, OR GET ALONG WITH OTHER PEOPLE: 0
3. TROUBLE FALLING OR STAYING ASLEEP: 0
4. FEELING TIRED OR HAVING LITTLE ENERGY: 0
SUM OF ALL RESPONSES TO PHQ QUESTIONS 1-9: 0
5. POOR APPETITE OR OVEREATING: 0
SUM OF ALL RESPONSES TO PHQ QUESTIONS 1-9: 0
9. THOUGHTS THAT YOU WOULD BE BETTER OFF DEAD, OR OF HURTING YOURSELF: 0
7. TROUBLE CONCENTRATING ON THINGS, SUCH AS READING THE NEWSPAPER OR WATCHING TELEVISION: 0
SUM OF ALL RESPONSES TO PHQ QUESTIONS 1-9: 0
8. MOVING OR SPEAKING SO SLOWLY THAT OTHER PEOPLE COULD HAVE NOTICED. OR THE OPPOSITE, BEING SO FIGETY OR RESTLESS THAT YOU HAVE BEEN MOVING AROUND A LOT MORE THAN USUAL: 0
SUM OF ALL RESPONSES TO PHQ QUESTIONS 1-9: 0
6. FEELING BAD ABOUT YOURSELF - OR THAT YOU ARE A FAILURE OR HAVE LET YOURSELF OR YOUR FAMILY DOWN: 0

## 2023-01-22 ASSESSMENT — ENCOUNTER SYMPTOMS
SHORTNESS OF BREATH: 0
SINUS PAIN: 0
NAUSEA: 0
DIARRHEA: 0
COUGH: 0
ABDOMINAL PAIN: 0
PHOTOPHOBIA: 0
SORE THROAT: 0
EYE REDNESS: 1
VOMITING: 0
EYE ITCHING: 1

## 2023-01-22 NOTE — PROGRESS NOTES
[address] [facility]  [address] [address]  Dept: [phone]  Dept Fax: [phone]    Ila Mohs is a 46 y.o. female who presents today for her medicalconditions/complaints as noted below. Ila Mohs is c/o of Facial Swelling (Pt has been having some redness and pain in her left eye with some swelling X 2 days )      HPI:         49-year-old female patient presents with concern for left eye redness, irritation. Patient reports that she noticed symptoms beginning week ago with mild redness to the left eye. Reports over the past 2 days she has noticed swelling and redness to the left upper eyelid. Patient reports vision has been unaffected. Patient has been wearing her glasses instead of her contacts. No additional symptoms reported. Past Medical History:   Diagnosis Date    Cough     phlegm    Hyperlipidemia     Hypertension     Hypothyroid     Menorrhagia     Seasonal allergies     Sinus headache     Uterine fibroid     Wears glasses         Current Outpatient Medications   Medication Sig Dispense Refill    predniSONE (DELTASONE) 20 MG tablet Take 2 tablets by mouth daily for 5 days 10 tablet 0    trimethoprim-polymyxin b (POLYTRIM) 86133-9.1 UNIT/ML-% ophthalmic solution Place 1 drop into the left eye in the morning, at noon, in the evening, and at bedtime for 10 days 10 mL 0    tamsulosin (FLOMAX) 0.4 MG capsule TAKE 1 CAPSULE BY MOUTH DAILY 30 capsule 0    fluticasone (FLONASE) 50 MCG/ACT nasal spray USE 2 SPRAYS NASALLY DAILY 48 g 3    ondansetron (ZOFRAN) 4 MG tablet Take 1 tablet by mouth every 8 hours as needed for Nausea or Vomiting 30 tablet 0    levothyroxine (SYNTHROID) 100 MCG tablet Take 1 tablet by mouth in the morning.  90 tablet 1    dicyclomine (BENTYL) 20 MG tablet Take 1 tablet by mouth every 6 hours as needed (cramping) 180 tablet 1    simvastatin (ZOCOR) 40 MG tablet TAKE 1 TABLET DAILY WITH BREAKFAST 90 tablet 3    traZODone (DESYREL) 150 MG tablet TAKE ONE-HALF (1/2) TABLET NIGHTLY 90 tablet 3    diphenhydrAMINE (BENADRYL) 25 MG capsule Take 25 mg by mouth every 6 hours as needed for Itching      Loratadine (CLARITIN PO) Take 10 mg by mouth daily       lisinopril-hydroCHLOROthiazide (PRINZIDE;ZESTORETIC) 10-12.5 MG per tablet Take 1 tablet by mouth daily 90 tablet 3     No current facility-administered medications for this visit. Allergies   Allergen Reactions    Avelox [Moxifloxacin Hcl In Nacl] Nausea Only    Codeine Nausea And Vomiting    Sulfa Antibiotics Nausea And Vomiting       Subjective:      Review of Systems   Constitutional:  Negative for chills and fever. HENT:  Negative for ear pain, sinus pain and sore throat. Eyes:  Positive for redness and itching. Negative for photophobia and visual disturbance. Respiratory:  Negative for cough and shortness of breath. Cardiovascular:  Negative for chest pain and palpitations. Gastrointestinal:  Negative for abdominal pain, diarrhea, nausea and vomiting. Neurological:  Negative for dizziness and headaches. All other systems reviewed and are negative.    :Objective     Physical Exam  Vitals and nursing note reviewed. Constitutional:       General: She is not in acute distress. Appearance: Normal appearance. She is not toxic-appearing. HENT:      Nose: Nose normal.      Mouth/Throat:      Mouth: Mucous membranes are moist.   Eyes:     Cardiovascular:      Rate and Rhythm: Normal rate. Pulmonary:      Effort: Pulmonary effort is normal.      Breath sounds: Normal breath sounds. Skin:     General: Skin is warm and dry. Neurological:      General: No focal deficit present. Mental Status: She is alert and oriented to person, place, and time.      /80 (Site: Left Upper Arm, Position: Sitting, Cuff Size: Large Adult)   Pulse 87   Ht 5' 3\" (1.6 m)   Wt 164 lb (74.4 kg)   LMP 12/07/2019 (Exact Date)   SpO2 100%   BMI 29.05 kg/m²     Lab Review   Admission on 12/12/2022, Discharged on 12/12/2022   Component Date Value    Color, UA 12/12/2022 Red (A)     Turbidity UA 12/12/2022 Turbid (A)     Glucose, Ur 12/12/2022 NEGATIVE     Bilirubin Urine 12/12/2022 Presumptive positive. Unable to confirm due to unavailability of reagent.  (A)     Ketones, Urine 12/12/2022 1+ (A)     Specific Gravity, UA 12/12/2022 1.040 (A)     Urine Hgb 12/12/2022 3+ (A)     pH, UA 12/12/2022 5.0     Protein, UA 12/12/2022 3+ (A)     Urobilinogen, Urine 12/12/2022 Normal     Nitrite, Urine 12/12/2022 POSITIVE (A)     Leukocyte Esterase, Urine 12/12/2022 TRACE (A)     WBC 12/12/2022 8.6     RBC 12/12/2022 4.28     Hemoglobin 12/12/2022 13.8     Hematocrit 12/12/2022 41.5     MCV 12/12/2022 97.0     MCH 12/12/2022 32.2     MCHC 12/12/2022 33.3     RDW 12/12/2022 11.9     Platelets 59/61/8366 258     MPV 12/12/2022 10.3     NRBC Automated 12/12/2022 0.0     Seg Neutrophils 12/12/2022 78 (A)     Lymphocytes 12/12/2022 12 (A)     Monocytes 12/12/2022 7     Eosinophils % 12/12/2022 2     Basophils 12/12/2022 0     Immature Granulocytes 12/12/2022 1 (A)     Segs Absolute 12/12/2022 6.82     Absolute Lymph # 12/12/2022 0.99 (A)     Absolute Mono # 12/12/2022 0.56     Absolute Eos # 12/12/2022 0.15     Basophils Absolute 12/12/2022 0.03     Absolute Immature Granul* 12/12/2022 0.04     Glucose 12/12/2022 111 (A)     BUN 12/12/2022 10     Creatinine 12/12/2022 0.91 (A)     Est, Glom Filt Rate 12/12/2022 >60     Bun/Cre Ratio 12/12/2022 11     Calcium 12/12/2022 9.7     Sodium 12/12/2022 138     Potassium 12/12/2022 4.1     Chloride 12/12/2022 101     CO2 12/12/2022 24     Anion Gap 12/12/2022 13     WBC, UA 12/12/2022 10 TO 20     RBC, UA 12/12/2022 TOO NUMEROUS TO COUNT     Epithelial Cells UA 12/12/2022 5 TO 10     Bacteria, UA 12/12/2022 MODERATE (A)     Amorphous, UA 12/12/2022 1+ (A)     Other Observations UA 12/12/2022 Microscopic performed on uncentrifuged urine -  Quantity Not Sufficient (A)     Specimen Description 12/12/2022 . CLEAN CATCH URINE     Culture 12/12/2022 STREPTOCOCCI, BETA HEMOLYTIC GROUP B 10 to 50,000 CFU/ML (A)     Culture 12/12/2022 [address] [facility] Outpatient Visit on 11/03/2022   Component Date Value    TSH 11/03/2022 2.99    Hospital Outpatient Visit on 11/03/2022   Component Date Value    WBC 11/03/2022 5.7     RBC 11/03/2022 4.37     Hemoglobin 11/03/2022 14.2     Hematocrit 11/03/2022 41.6     MCV 11/03/2022 95.3     MCH 11/03/2022 32.5     MCHC 11/03/2022 34.1     RDW 11/03/2022 12.3 (A)     Platelets 81/03/9751 308     MPV 11/03/2022 8.8     Seg Neutrophils 11/03/2022 70 (A)     Lymphocytes 11/03/2022 19 (A)     Monocytes 11/03/2022 9     Eosinophils % 11/03/2022 1     Basophils 11/03/2022 1     Segs Absolute 11/03/2022 4.00     Absolute Lymph # 11/03/2022 1.10     Absolute Mono # 11/03/2022 0.50     Absolute Eos # 11/03/2022 0.00     Basophils Absolute 11/03/2022 0.00     Glucose 11/03/2022 93     BUN 11/03/2022 10     Creatinine 11/03/2022 0.80     Est, Glom Filt Rate 11/03/2022 >60     Calcium 11/03/2022 9.8     Sodium 11/03/2022 138     Potassium 11/03/2022 4.4     Chloride 11/03/2022 99     CO2 11/03/2022 27     Anion Gap 11/03/2022 12     Alkaline Phosphatase 11/03/2022 57     ALT 11/03/2022 15     AST 11/03/2022 25     Total Bilirubin 11/03/2022 0.5     Total Protein 11/03/2022 7.7     Albumin 11/03/2022 5.2     Albumin/Globulin Ratio 11/03/2022 2.1     Lactic Acid 11/03/2022 1.1     Color, UA 11/03/2022 Yellow     Turbidity UA 11/03/2022 Clear     Glucose, Ur 11/03/2022 NEGATIVE     Bilirubin Urine 11/03/2022 NEGATIVE     Ketones, Urine 11/03/2022 SMALL (A)     Specific [place], UA 11/03/2022 1.058 (A)     Urine Hgb 11/03/2022 NEGATIVE     pH, UA 11/03/2022 8.0     Protein, UA 11/03/2022 NEGATIVE     Urobilinogen, Urine 11/03/2022 Normal     Nitrite, Urine 11/03/2022 NEGATIVE Leukocyte Esterase, Urine 11/03/2022 NEGATIVE     WBC, UA 11/03/2022 None     RBC, UA 11/03/2022 2 TO 5     Epithelial Cells UA 11/03/2022 0 TO 2     Specimen Description 11/03/2022 . [address] URINE     Culture 11/03/2022 [facility] Outpatient Visit on 08/01/2022   Component Date Value    TSH 08/01/2022 13.31 (A)     Thyroxine, Free 08/01/2022 1.17        Assessment and Plan      1. Eyelid gland swelling, left  -     predniSONE (DELTASONE) 20 MG tablet; Take 2 tablets by mouth daily for 5 days, Disp-10 tablet, R-0Normal  -     trimethoprim-polymyxin b (POLYTRIM) 78235-8.1 UNIT/ML-% ophthalmic solution; Place 1 drop into the left eye in the morning, at noon, in the evening, and at bedtime for 10 days, Disp-10 mL, R-0Normal     Treated for a derotation with oral steroids, topical antibiotics per  Recommend warm compress as needed for drainage, cool compress as needed for swelling. Follow-up with eye specialist return as needed          No results found for this visit on 01/22/23. Return if symptoms worsen or fail to improve. Orders Placed This Encounter   Medications    predniSONE (DELTASONE) 20 MG tablet     Sig: Take 2 tablets by mouth daily for 5 days     Dispense:  10 tablet     Refill:  0    trimethoprim-polymyxin b (POLYTRIM) 52395-0.1 UNIT/ML-% ophthalmic solution     Sig: Place 1 drop into the left eye in the morning, at noon, in the evening, and at bedtime for 10 days     Dispense:  10 mL     Refill:  0        Patient given educational materials - see patient instructions. Discussed use, benefit, and side effects of prescribed medications. All patientquestions answered. Pt voiced understanding. Patient given educational materials - see patient instructions. Discussed use, benefit, and side effects of prescribed medications. All patientquestions answered. Pt voiced understanding. This note was transcribed using dictation with Dragon services.  Efforts were made to correct any errors but some words may be misinterpreted.     Patient assumes risks associated with failure to complete recommended testing and treatments in a timely manner    Electronically signed by CASSANDRA Bal CNP on 1/22/2023at 11:28 AM

## 2023-01-25 DIAGNOSIS — E03.9 HYPOTHYROIDISM, UNSPECIFIED TYPE: ICD-10-CM

## 2023-01-25 RX ORDER — LEVOTHYROXINE SODIUM 0.1 MG/1
TABLET ORAL
Qty: 90 TABLET | Refills: 3 | Status: SHIPPED | OUTPATIENT
Start: 2023-01-25

## 2023-01-25 NOTE — TELEPHONE ENCOUNTER
Keri Scruggs is calling to request a refill on the following medication(s):    Medication Request:  Requested Prescriptions     Pending Prescriptions Disp Refills    levothyroxine (SYNTHROID) 100 MCG tablet [Pharmacy Med Name: L-THYROXINE (SYNTHROID) TABS 100MCG] 90 tablet 3     Sig: TAKE 1 TABLET IN THE MORNING       Last Visit Date (If Applicable):  8/16/2022    Next Visit Date:    3/3/2023

## 2023-03-03 ENCOUNTER — OFFICE VISIT (OUTPATIENT)
Dept: FAMILY MEDICINE CLINIC | Age: 53
End: 2023-03-03
Payer: COMMERCIAL

## 2023-03-03 VITALS
DIASTOLIC BLOOD PRESSURE: 80 MMHG | WEIGHT: 164 LBS | BODY MASS INDEX: 29.05 KG/M2 | HEART RATE: 60 BPM | OXYGEN SATURATION: 96 % | SYSTOLIC BLOOD PRESSURE: 130 MMHG

## 2023-03-03 DIAGNOSIS — R73.01 IFG (IMPAIRED FASTING GLUCOSE): ICD-10-CM

## 2023-03-03 DIAGNOSIS — E78.2 MIXED HYPERLIPIDEMIA: Chronic | ICD-10-CM

## 2023-03-03 DIAGNOSIS — G47.9 SLEEP TROUBLE: ICD-10-CM

## 2023-03-03 DIAGNOSIS — E03.9 HYPOTHYROIDISM, UNSPECIFIED TYPE: ICD-10-CM

## 2023-03-03 DIAGNOSIS — F41.9 ANXIETY: ICD-10-CM

## 2023-03-03 DIAGNOSIS — I10 PRIMARY HYPERTENSION: Primary | Chronic | ICD-10-CM

## 2023-03-03 DIAGNOSIS — M77.11 RIGHT LATERAL EPICONDYLITIS: ICD-10-CM

## 2023-03-03 PROCEDURE — G8427 DOCREV CUR MEDS BY ELIG CLIN: HCPCS | Performed by: FAMILY MEDICINE

## 2023-03-03 PROCEDURE — 99214 OFFICE O/P EST MOD 30 MIN: CPT | Performed by: FAMILY MEDICINE

## 2023-03-03 PROCEDURE — 3017F COLORECTAL CA SCREEN DOC REV: CPT | Performed by: FAMILY MEDICINE

## 2023-03-03 PROCEDURE — 3074F SYST BP LT 130 MM HG: CPT | Performed by: FAMILY MEDICINE

## 2023-03-03 PROCEDURE — G8417 CALC BMI ABV UP PARAM F/U: HCPCS | Performed by: FAMILY MEDICINE

## 2023-03-03 PROCEDURE — 3078F DIAST BP <80 MM HG: CPT | Performed by: FAMILY MEDICINE

## 2023-03-03 PROCEDURE — 1036F TOBACCO NON-USER: CPT | Performed by: FAMILY MEDICINE

## 2023-03-03 PROCEDURE — G8484 FLU IMMUNIZE NO ADMIN: HCPCS | Performed by: FAMILY MEDICINE

## 2023-03-03 RX ORDER — LISINOPRIL AND HYDROCHLOROTHIAZIDE 12.5; 1 MG/1; MG/1
1 TABLET ORAL DAILY
Qty: 90 TABLET | Refills: 3 | Status: SHIPPED | OUTPATIENT
Start: 2023-03-03 | End: 2023-06-01

## 2023-03-03 SDOH — ECONOMIC STABILITY: FOOD INSECURITY: WITHIN THE PAST 12 MONTHS, YOU WORRIED THAT YOUR FOOD WOULD RUN OUT BEFORE YOU GOT MONEY TO BUY MORE.: NEVER TRUE

## 2023-03-03 SDOH — ECONOMIC STABILITY: INCOME INSECURITY: HOW HARD IS IT FOR YOU TO PAY FOR THE VERY BASICS LIKE FOOD, HOUSING, MEDICAL CARE, AND HEATING?: NOT HARD AT ALL

## 2023-03-03 SDOH — ECONOMIC STABILITY: FOOD INSECURITY: WITHIN THE PAST 12 MONTHS, THE FOOD YOU BOUGHT JUST DIDN'T LAST AND YOU DIDN'T HAVE MONEY TO GET MORE.: NEVER TRUE

## 2023-03-03 SDOH — ECONOMIC STABILITY: HOUSING INSECURITY
IN THE LAST 12 MONTHS, WAS THERE A TIME WHEN YOU DID NOT HAVE A STEADY PLACE TO SLEEP OR SLEPT IN A SHELTER (INCLUDING NOW)?: NO

## 2023-03-03 ASSESSMENT — ENCOUNTER SYMPTOMS
BLURRED VISION: 0
SHORTNESS OF BREATH: 0
ORTHOPNEA: 0

## 2023-03-03 NOTE — PROGRESS NOTES
[facility] Progress Note    Date:3/3/2023         Patient Name:Keri Suero     YOB: 1970     Age:52 y.o. Assessment/Plan        Problem List Items Addressed This Visit          Circulatory    Hypertension - Primary (Chronic)      Well-controlled, continue current medications, continue current treatment plan, medication adherence emphasized and lifestyle modifications recommended         Relevant Medications    lisinopril-hydroCHLOROthiazide (PRINZIDE;ZESTORETIC) 10-12.5 MG per tablet    Other Relevant Orders    CBC with Auto Differential       Endocrine    Hypothyroidism      Unclear control, continue current plan pending work up below         Relevant Orders    CBC with Auto Differential    TSH with Reflex       Other    Hyperlipemia (Chronic)      Well-controlled, continue current medications and continue current treatment plan         Relevant Medications    lisinopril-hydroCHLOROthiazide (PRINZIDE;ZESTORETIC) 10-12.5 MG per tablet    Other Relevant Orders    CBC with Auto Differential    Comprehensive Metabolic Panel    Lipid Panel    Anxiety      Well-controlled, continue current medications and continue current treatment plan         Sleep trouble      Well-controlled, continue current medications and continue current treatment plan          Other Visit Diagnoses       IFG (impaired fasting glucose)        Relevant Orders    Hemoglobin A1C    Right lateral epicondylitis                 Return in about 6 months (around 9/3/2023). Electronically signed by Noa Salinas DO on 3/3/23       Total time spent was between Time personally spent assessing and managing the patient on the date of service: Est: 30-39 minutes ([id number]) mins.  This included time spent reviewing the patient's medical record (e.g., recent visits, labs, and studies); seeing the patient in the office (face-to-face time); ordering medications, studies, procedures, or referrals; calling the patient or family later in the day with results and further recommendations; and documenting the visit in the medical record. Bladimir Guerin is a 46 y.o. female presenting today for   Chief Complaint   Patient presents with    Hypertension   . Hypothyroidism  Patient complains of hypothyroidism. Current symptoms: none. Patient denies change in energy level, diarrhea, heat / cold intolerance, nervousness, palpitations and weight changes. Onset of symptoms was several years ago. Symptoms have been well-controlled. Jone Rice is a 46 y.o. female who complains of insomnia. Onset was several months ago. Patient describes symptoms as difficulty falling asleep. Patient has found moderate relief with prescription sleep aid- benzodiazepine- xanax. Associated symptoms include: anxiety. Patient denies snoring. Symptoms have progressed to a point and plateaued. Lawrence Goldberg is a 46 y.o. female who presents for follow up of anxiety disorder. Current symptoms: racing thoughts. She denies current suicidal and homicidal ideation. She complains of the following side effects from the treatment: none. Hypertension  This is a chronic problem. The current episode started more than 1 year ago. The problem has been gradually improving since onset. The problem is controlled. Associated symptoms include anxiety. Pertinent negatives include no blurred vision, chest pain, headaches, malaise/fatigue, neck pain, orthopnea, palpitations, peripheral edema, PND, shortness of breath or sweats. Past treatments include ACE inhibitors and diuretics. The current treatment provides significant improvement. There is no history of chronic renal disease. Hyperlipidemia  This is a chronic problem. The current episode started more than 1 year ago. The problem is controlled. Recent lipid tests were reviewed and are normal. Exacerbating diseases include hypothyroidism.  She has no history of chronic renal disease, diabetes, liver disease, obesity or nephrotic syndrome. Pertinent negatives include no chest pain, focal sensory loss, focal weakness, leg pain, myalgias or shortness of breath. Current antihyperlipidemic treatment includes statins. The current treatment provides significant improvement of lipids. Review of Systems   Review of Systems   Constitutional: Negative. Negative for malaise/fatigue. HENT: Negative. Eyes: Negative. Negative for blurred vision. Respiratory: Negative. Negative for shortness of breath. Cardiovascular: Negative. Negative for chest pain, palpitations, orthopnea and PND. Gastrointestinal: Negative. Endocrine: Negative. Genitourinary: Negative. Musculoskeletal: Negative. Negative for myalgias and neck pain. Skin: Negative. Allergic/Immunologic: Negative. Neurological: Negative. Negative for focal weakness and headaches. Hematological: Negative. Psychiatric/Behavioral: Negative. All other systems reviewed and are negative.     Medications     Current Outpatient Medications   Medication Sig Dispense Refill    lisinopril-hydroCHLOROthiazide (PRINZIDE;ZESTORETIC) 10-12.5 MG per tablet Take 1 tablet by mouth daily 90 tablet 3    levothyroxine (SYNTHROID) 100 MCG tablet TAKE 1 TABLET IN THE MORNING 90 tablet 3    fluticasone (FLONASE) 50 MCG/ACT nasal spray USE 2 SPRAYS NASALLY DAILY 48 g 3    ondansetron (ZOFRAN) 4 MG tablet Take 1 tablet by mouth every 8 hours as needed for Nausea or Vomiting 30 tablet 0    dicyclomine (BENTYL) 20 MG tablet Take 1 tablet by mouth every 6 hours as needed (cramping) 180 tablet 1    simvastatin (ZOCOR) 40 MG tablet TAKE 1 TABLET DAILY WITH BREAKFAST 90 tablet 3    traZODone (DESYREL) 150 MG tablet TAKE ONE-HALF (1/2) TABLET NIGHTLY 90 tablet 3    diphenhydrAMINE (BENADRYL) 25 MG capsule Take 25 mg by mouth every 6 hours as needed for Itching      Loratadine (CLARITIN PO) Take 10 mg by mouth daily       tamsulosin (FLOMAX) 0.4 MG capsule TAKE 1 CAPSULE BY MOUTH DAILY (Patient not taking: Reported on 3/3/2023) 30 capsule 0     No current facility-administered medications for this visit. Past History    Past Medical History:   has a past medical history of Cough, Hyperlipidemia, Hypertension, Hypothyroid, Menorrhagia, Seasonal allergies, Sinus headache, Uterine fibroid, and Wears glasses. Social History:   reports that she has never smoked. She has never used smokeless tobacco. She reports current alcohol use. She reports that she does not use drugs. Family History:   Family History   Problem Relation Age of Onset    High Blood Pressure Mother     High Cholesterol Mother     Hypothyroidism Mother     Cancer Maternal Grandmother         kidney    Rheum Arthritis Paternal Grandmother        Surgical History:   Past Surgical History:   Procedure Laterality Date     SECTION      DILATION AND CURETTAGE OF UTERUS N/A 2019    DILATATION AND CURETTAGE HYSTEROSCOPY performed by Courtney Chandra MD at [facility] N/A 2020    HYSTERECTOMY VAGINAL LAPAROSCOPIC ASSISTED (LAVH) WITH CYSTO AND LYSIS OF ADHESIONS performed by Courtney Chandra MD at [address] [facility] LEFT Left 2021    US BREAST NEEDLE BIOPSY LEFT 2021 [facility] ULTRASOUND    WISDOM TOOTH EXTRACTION          Physical Examination      Vitals:  /80   Pulse 60   Wt 164 lb (74.4 kg)   LMP 2019 (Exact Date)   SpO2 96%   BMI 29.05 kg/m²     Physical Exam  Vitals and nursing note reviewed. Constitutional:       General: She is not in acute distress. Appearance: Normal appearance. She is normal weight. She is not ill-appearing, toxic-appearing or diaphoretic. HENT:      Head: Normocephalic and atraumatic. Eyes:      General: No scleral icterus. Right eye: No discharge. Left eye: No discharge. Extraocular Movements: Extraocular movements intact.       Conjunctiva/sclera: Conjunctivae normal.   Cardiovascular:      Rate and Rhythm: Normal rate and regular rhythm. Pulses: Normal pulses. Heart sounds: Normal heart sounds. No murmur heard. No friction rub. No gallop. Pulmonary:      Effort: Pulmonary effort is normal. No respiratory distress. Breath sounds: Normal breath sounds. No stridor. No wheezing, rhonchi or rales. Chest:      Chest wall: No tenderness. Neurological:      Mental Status: She is alert and oriented to person, place, and time. Mental status is at baseline. Psychiatric:         Mood and Affect: Mood normal.         Behavior: Behavior normal.         Thought Content: Thought content normal.         Judgment: Judgment normal.       Labs/Imaging/Diagnostics   Labs:  No results found for: CMPWITHGFR, CBCAUTODIF, TSHFT4, LABA1C, LIPIDPAN    Imaging Last 24 Hours:  [facility] CARLO DIGITAL DIAGNOSTIC UNILATERAL LEFT  Narrative: EXAMINATION:  DIAGNOSTIC DIGITAL LEFT BREAST MAMMOGRAM WITH TOMOSYNTHESIS; TARGETED  ULTRASOUND OF THE LEFT BREAST    1/6/2023 8:29 am; 1/6/2023 8:38 am    TECHNIQUE:  Diagnostic mammography of the left breast was performed with   tomosynthesis. 2D standard and 3D tomosynthesis combination imaging performed through the  left breast.  Computer aided detection was utilized in the interpretation   of  this exam.; Target ultrasound of the left breast was performed. Views: Diagnostic mammogram spot tomosynthesis views of the left breast in   CC  and MLO projections obtained. COMPARISON:  Multiple prior comparison mammograms most recent dated 12/03/2022. HISTORY:  ORDERING SYSTEM PROVIDED HISTORY: Abnormal mammogram of left breast  TECHNOLOGIST PROVIDED HISTORY:  Is the patient pregnant?->No; ORDERING SYSTEM PROVIDED HISTORY: Abnormal  mammogram of left breast  TECHNOLOGIST PROVIDED HISTORY:  See ICDM-10 code attached    80-year-old female callback left breast mass.     FINDINGS:    Mammogram:    There are scattered areas of fibroglandular density. Left breast: There is a circumscribed oval dense mass with suggestion of  fatty hilum in the left breast 12 o'clock axis 9 cm from the nipple   measuring  0.4 x 0.3 cm. Finding possibly present on prior 2015 study in although  comparison limited due to differences in technique. Further evaluation   with  targeted ultrasound performed. Ultrasound:    Targeted ultrasound of the left breast performed. Left breast: There is a nonenlarged benign-appearing lymph nodes maintain   a  fatty hilum and thin cortex in the left breast 12 o'clock axis 3 cm from   the  nipple measuring 0.7 x 0.3 x 0.5 cm. Uncertain if this benign lymph node  correlates with the mammographic finding. No definitive sonographic  correlate could be identified for the likely benign lymph node seen in the  left breast 12 o'clock axis 9 cm from the nipple. Extensive evaluation of  the superior and retroareolar left breast without solid mass or suspicious  sonographic abnormalities. Impression: Probable benign 0.4 cm oval mass with suggestion of fatty hilum in the   left  breast 12 o'clock axis 9 cm from the nipple. Finding likely present on   prior  2015 study although comparison limited due to differences in technique. This  small mass likely represents a small benign lymph node. Recommend   follow-up  in 6 months with diagnostic mammogram spot tomosynthesis views to assess   for  stability. BI-RADS 3    BIRADS:  BIRADS - CATEGORY 3    Probably Benign Findings. A short interval follow-up is recommended in 6  months. OVERALL ASSESSMENT - PROBABLY BENIGN. A letter of notification will be sent to the patient regarding the   results.   US BREAST LIMITED LEFT  Narrative: EXAMINATION:  DIAGNOSTIC DIGITAL LEFT BREAST MAMMOGRAM WITH TOMOSYNTHESIS; TARGETED  ULTRASOUND OF THE LEFT BREAST    1/6/2023 8:29 am; 1/6/2023 8:38 am    TECHNIQUE:  Diagnostic mammography of the left breast was performed with tomosynthesis. 2D standard and 3D tomosynthesis combination imaging performed through the  left breast.  Computer aided detection was utilized in the interpretation   of  this exam.; Target ultrasound of the left breast was performed. Views: Diagnostic mammogram spot tomosynthesis views of the left breast in   CC  and MLO projections obtained. COMPARISON:  Multiple prior comparison mammograms most recent dated 12/03/2022. HISTORY:  ORDERING SYSTEM PROVIDED HISTORY: Abnormal mammogram of left breast  TECHNOLOGIST PROVIDED HISTORY:  Is the patient pregnant?->No; ORDERING SYSTEM PROVIDED HISTORY: Abnormal  mammogram of left breast  TECHNOLOGIST PROVIDED HISTORY:  See ICDM-10 code attached    59-year-old female callback left breast mass. FINDINGS:    Mammogram:    There are scattered areas of fibroglandular density. Left breast: There is a circumscribed oval dense mass with suggestion of  fatty hilum in the left breast 12 o'clock axis 9 cm from the nipple   measuring  0.4 x 0.3 cm. Finding possibly present on prior 2015 study in although  comparison limited due to differences in technique. Further evaluation   with  targeted ultrasound performed. Ultrasound:    Targeted ultrasound of the left breast performed. Left breast: There is a nonenlarged benign-appearing lymph nodes maintain   a  fatty hilum and thin cortex in the left breast 12 o'clock axis 3 cm from   the  nipple measuring 0.7 x 0.3 x 0.5 cm. Uncertain if this benign lymph node  correlates with the mammographic finding. No definitive sonographic  correlate could be identified for the likely benign lymph node seen in the  left breast 12 o'clock axis 9 cm from the nipple. Extensive evaluation of  the superior and retroareolar left breast without solid mass or suspicious  sonographic abnormalities. Impression: Probable benign 0.4 cm oval mass with suggestion of fatty hilum in the   left  breast 12 o'clock axis 9 cm from the nipple. Finding likely present on   prior  2015 study although comparison limited due to differences in technique. This  small mass likely represents a small benign lymph node. Recommend   follow-up  in 6 months with diagnostic mammogram spot tomosynthesis views to assess   for  stability. BI-RADS 3    BIRADS:  BIRADS - CATEGORY 3    Probably Benign Findings. A short interval follow-up is recommended in 6  months. OVERALL ASSESSMENT - PROBABLY BENIGN. A letter of notification will be sent to the patient regarding the   results.

## 2023-03-06 ASSESSMENT — ENCOUNTER SYMPTOMS
RESPIRATORY NEGATIVE: 1
EYES NEGATIVE: 1
ALLERGIC/IMMUNOLOGIC NEGATIVE: 1
GASTROINTESTINAL NEGATIVE: 1

## 2023-03-25 ENCOUNTER — PATIENT MESSAGE (OUTPATIENT)
Dept: FAMILY MEDICINE CLINIC | Age: 53
End: 2023-03-25

## 2023-03-25 DIAGNOSIS — F40.243 FEAR OF FLYING: Primary | ICD-10-CM

## 2023-03-27 NOTE — TELEPHONE ENCOUNTER
From: Clarisse Dykes  To: Dr. Glenna Zamora  Sent: 3/25/2023 2:47 PM EDT  Subject: Xanax RX    Hi,   I saw Dr. Ashley Kennedy on Friday, March 4, 2023. I talked to Dr. Ashley Kennedy about some upcoming travel that involved flying. I asked if I could have a xanax prescription to help with anxiety with flying. He agreed at the appointment. A prescription was never ordered. Could you please discuss this with Dr. Ashley Kennedy?     Thanks,   Carmella Younger

## 2023-03-28 RX ORDER — ALPRAZOLAM 0.25 MG/1
0.25 TABLET ORAL
Qty: 6 TABLET | Refills: 0 | Status: SHIPPED | OUTPATIENT
Start: 2023-03-28 | End: 2023-03-28

## 2023-03-29 ENCOUNTER — HOSPITAL ENCOUNTER (OUTPATIENT)
Facility: CLINIC | Age: 53
Discharge: HOME | End: 2023-03-31
Payer: COMMERCIAL

## 2023-03-29 ENCOUNTER — HOSPITAL ENCOUNTER (OUTPATIENT)
Dept: GENERAL RADIOLOGY | Facility: CLINIC | Age: 53
Discharge: HOME | End: 2023-03-31
Payer: COMMERCIAL

## 2023-03-29 ENCOUNTER — HOSPITAL ENCOUNTER (OUTPATIENT)
Dept: ULTRASOUND IMAGING | Facility: CLINIC | Age: 53
Discharge: HOME | End: 2023-03-31
Payer: COMMERCIAL

## 2023-03-29 DIAGNOSIS — N20.0 KIDNEY STONE: ICD-10-CM

## 2023-03-29 DIAGNOSIS — N20.0 KIDNEY STONES: ICD-10-CM

## 2023-03-29 PROCEDURE — 74018 RADEX ABDOMEN 1 VIEW: CPT

## 2023-03-29 PROCEDURE — 76770 US EXAM ABDO BACK WALL COMP: CPT

## 2023-05-15 RX ORDER — LANOLIN ALCOHOL/MO/W.PET/CERES
3 CREAM (GRAM) TOPICAL NIGHTLY
COMMUNITY

## 2023-05-16 ENCOUNTER — ANESTHESIA (OUTPATIENT)
Dept: OPERATING ROOM | Age: 53
End: 2023-05-16
Payer: COMMERCIAL

## 2023-05-16 ENCOUNTER — APPOINTMENT (OUTPATIENT)
Dept: GENERAL RADIOLOGY | Age: 53
End: 2023-05-16
Attending: UROLOGY
Payer: COMMERCIAL

## 2023-05-16 ENCOUNTER — HOSPITAL ENCOUNTER (OUTPATIENT)
Age: 53
Setting detail: OUTPATIENT SURGERY
Discharge: HOME | End: 2023-05-16
Attending: UROLOGY | Admitting: UROLOGY
Payer: COMMERCIAL

## 2023-05-16 ENCOUNTER — ANESTHESIA EVENT (OUTPATIENT)
Dept: OPERATING ROOM | Age: 53
End: 2023-05-16
Payer: COMMERCIAL

## 2023-05-16 VITALS
TEMPERATURE: 98.1 F | RESPIRATION RATE: 14 BRPM | DIASTOLIC BLOOD PRESSURE: 78 MMHG | HEIGHT: 62 IN | SYSTOLIC BLOOD PRESSURE: 124 MMHG | OXYGEN SATURATION: 99 % | WEIGHT: 165 LBS | BODY MASS INDEX: 30.36 KG/M2 | HEART RATE: 76 BPM

## 2023-05-16 DIAGNOSIS — G89.18 ACUTE POST-OPERATIVE PAIN: Primary | ICD-10-CM

## 2023-05-16 LAB
ANION GAP: 10 MMOL/L (ref 7–16)
EGFR, POC: >60 ML/MIN/1.73M2
GLUCOSE BLD-MCNC: 91 MG/DL (ref 74–100)
POC BUN: 10 MG/DL (ref 8–26)
POC CHLORIDE: 103 MMOL/L (ref 98–107)
POC CREATININE: 0.9 MG/DL (ref 0.51–1.19)
POC SODIUM: 141 MMOL/L (ref 138–146)
POC TCO2: 29 MMOL/L (ref 22–30)
POTASSIUM BLD-SCNC: 3.6 MMOL/L (ref 3.5–4.5)

## 2023-05-16 PROCEDURE — 7100000011 HC PHASE II RECOVERY - ADDTL 15 MIN: Performed by: UROLOGY

## 2023-05-16 PROCEDURE — 3700000000 HC ANESTHESIA ATTENDED CARE: Performed by: UROLOGY

## 2023-05-16 PROCEDURE — 74018 RADEX ABDOMEN 1 VIEW: CPT

## 2023-05-16 PROCEDURE — 3600000002 HC SURGERY LEVEL 2 BASE: Performed by: UROLOGY

## 2023-05-16 PROCEDURE — 3700000001 HC ADD 15 MINUTES (ANESTHESIA): Performed by: UROLOGY

## 2023-05-16 PROCEDURE — 2580000003 HC RX 258: Performed by: ANESTHESIOLOGY

## 2023-05-16 PROCEDURE — 3600000012 HC SURGERY LEVEL 2 ADDTL 15MIN: Performed by: UROLOGY

## 2023-05-16 PROCEDURE — 6360000002 HC RX W HCPCS: Performed by: NURSE ANESTHETIST, CERTIFIED REGISTERED

## 2023-05-16 PROCEDURE — 7100000001 HC PACU RECOVERY - ADDTL 15 MIN: Performed by: UROLOGY

## 2023-05-16 PROCEDURE — 2500000003 HC RX 250 WO HCPCS: Performed by: NURSE ANESTHETIST, CERTIFIED REGISTERED

## 2023-05-16 PROCEDURE — 7100000000 HC PACU RECOVERY - FIRST 15 MIN: Performed by: UROLOGY

## 2023-05-16 PROCEDURE — 7100000010 HC PHASE II RECOVERY - FIRST 15 MIN: Performed by: UROLOGY

## 2023-05-16 PROCEDURE — 2580000003 HC RX 258: Performed by: NURSE ANESTHETIST, CERTIFIED REGISTERED

## 2023-05-16 PROCEDURE — 80051 ELECTROLYTE PANEL: CPT

## 2023-05-16 PROCEDURE — 82565 ASSAY OF CREATININE: CPT

## 2023-05-16 PROCEDURE — 84520 ASSAY OF UREA NITROGEN: CPT

## 2023-05-16 PROCEDURE — 2709999900 HC NON-CHARGEABLE SUPPLY: Performed by: UROLOGY

## 2023-05-16 PROCEDURE — 82947 ASSAY GLUCOSE BLOOD QUANT: CPT

## 2023-05-16 RX ORDER — SODIUM CHLORIDE 9 MG/ML
25 INJECTION, SOLUTION INTRAVENOUS PRN
Status: CANCELLED | OUTPATIENT
Start: 2023-05-16

## 2023-05-16 RX ORDER — SODIUM CHLORIDE 0.9 % (FLUSH) 0.9 %
5-40 SYRINGE (ML) INJECTION EVERY 12 HOURS SCHEDULED
Status: CANCELLED | OUTPATIENT
Start: 2023-05-16

## 2023-05-16 RX ORDER — PROPOFOL 10 MG/ML
INJECTION, EMULSION INTRAVENOUS PRN
Status: DISCONTINUED | OUTPATIENT
Start: 2023-05-16 | End: 2023-05-16 | Stop reason: SDUPTHER

## 2023-05-16 RX ORDER — DEXAMETHASONE SODIUM PHOSPHATE 4 MG/ML
INJECTION, SOLUTION INTRA-ARTICULAR; INTRALESIONAL; INTRAMUSCULAR; INTRAVENOUS; SOFT TISSUE PRN
Status: DISCONTINUED | OUTPATIENT
Start: 2023-05-16 | End: 2023-05-16 | Stop reason: SDUPTHER

## 2023-05-16 RX ORDER — HYDRALAZINE HYDROCHLORIDE 20 MG/ML
10 INJECTION INTRAMUSCULAR; INTRAVENOUS
Status: CANCELLED | OUTPATIENT
Start: 2023-05-16

## 2023-05-16 RX ORDER — METOCLOPRAMIDE HYDROCHLORIDE 5 MG/ML
10 INJECTION INTRAMUSCULAR; INTRAVENOUS
Status: CANCELLED | OUTPATIENT
Start: 2023-05-16 | End: 2023-05-17

## 2023-05-16 RX ORDER — DIPHENHYDRAMINE HYDROCHLORIDE 50 MG/ML
12.5 INJECTION INTRAMUSCULAR; INTRAVENOUS
Status: CANCELLED | OUTPATIENT
Start: 2023-05-16 | End: 2023-05-17

## 2023-05-16 RX ORDER — SODIUM CHLORIDE, SODIUM LACTATE, POTASSIUM CHLORIDE, CALCIUM CHLORIDE 600; 310; 30; 20 MG/100ML; MG/100ML; MG/100ML; MG/100ML
INJECTION, SOLUTION INTRAVENOUS CONTINUOUS
Status: DISCONTINUED | OUTPATIENT
Start: 2023-05-16 | End: 2023-05-16 | Stop reason: HOSPADM

## 2023-05-16 RX ORDER — FENTANYL CITRATE 50 UG/ML
INJECTION, SOLUTION INTRAMUSCULAR; INTRAVENOUS PRN
Status: DISCONTINUED | OUTPATIENT
Start: 2023-05-16 | End: 2023-05-16 | Stop reason: SDUPTHER

## 2023-05-16 RX ORDER — MEPERIDINE HYDROCHLORIDE 50 MG/ML
12.5 INJECTION INTRAMUSCULAR; INTRAVENOUS; SUBCUTANEOUS EVERY 5 MIN PRN
Status: CANCELLED | OUTPATIENT
Start: 2023-05-16

## 2023-05-16 RX ORDER — SODIUM CHLORIDE, SODIUM LACTATE, POTASSIUM CHLORIDE, CALCIUM CHLORIDE 600; 310; 30; 20 MG/100ML; MG/100ML; MG/100ML; MG/100ML
INJECTION, SOLUTION INTRAVENOUS CONTINUOUS PRN
Status: DISCONTINUED | OUTPATIENT
Start: 2023-05-16 | End: 2023-05-16 | Stop reason: SDUPTHER

## 2023-05-16 RX ORDER — TAMSULOSIN HYDROCHLORIDE 0.4 MG/1
0.4 CAPSULE ORAL DAILY
Qty: 30 CAPSULE | Refills: 0 | Status: SHIPPED | OUTPATIENT
Start: 2023-05-16

## 2023-05-16 RX ORDER — SODIUM CHLORIDE 0.9 % (FLUSH) 0.9 %
5-40 SYRINGE (ML) INJECTION PRN
Status: CANCELLED | OUTPATIENT
Start: 2023-05-16

## 2023-05-16 RX ORDER — LIDOCAINE HYDROCHLORIDE 10 MG/ML
INJECTION, SOLUTION EPIDURAL; INFILTRATION; INTRACAUDAL; PERINEURAL PRN
Status: DISCONTINUED | OUTPATIENT
Start: 2023-05-16 | End: 2023-05-16 | Stop reason: SDUPTHER

## 2023-05-16 RX ORDER — CEFAZOLIN SODIUM 1 G/3ML
INJECTION, POWDER, FOR SOLUTION INTRAMUSCULAR; INTRAVENOUS PRN
Status: DISCONTINUED | OUTPATIENT
Start: 2023-05-16 | End: 2023-05-16 | Stop reason: SDUPTHER

## 2023-05-16 RX ORDER — PHENYLEPHRINE HCL IN 0.9% NACL 1 MG/10 ML
SYRINGE (ML) INTRAVENOUS PRN
Status: DISCONTINUED | OUTPATIENT
Start: 2023-05-16 | End: 2023-05-16 | Stop reason: SDUPTHER

## 2023-05-16 RX ORDER — OXYCODONE HYDROCHLORIDE 5 MG/1
5 TABLET ORAL EVERY 6 HOURS PRN
Qty: 12 TABLET | Refills: 0 | Status: SHIPPED | OUTPATIENT
Start: 2023-05-16 | End: 2023-05-19

## 2023-05-16 RX ORDER — DROPERIDOL 2.5 MG/ML
0.62 INJECTION, SOLUTION INTRAMUSCULAR; INTRAVENOUS
Status: CANCELLED | OUTPATIENT
Start: 2023-05-16 | End: 2023-05-17

## 2023-05-16 RX ORDER — ONDANSETRON 2 MG/ML
INJECTION INTRAMUSCULAR; INTRAVENOUS PRN
Status: DISCONTINUED | OUTPATIENT
Start: 2023-05-16 | End: 2023-05-16 | Stop reason: SDUPTHER

## 2023-05-16 RX ADMIN — SODIUM CHLORIDE, POTASSIUM CHLORIDE, SODIUM LACTATE AND CALCIUM CHLORIDE: 600; 310; 30; 20 INJECTION, SOLUTION INTRAVENOUS at 16:05

## 2023-05-16 RX ADMIN — SODIUM CHLORIDE, POTASSIUM CHLORIDE, SODIUM LACTATE AND CALCIUM CHLORIDE: 600; 310; 30; 20 INJECTION, SOLUTION INTRAVENOUS at 12:52

## 2023-05-16 RX ADMIN — LIDOCAINE HYDROCHLORIDE 50 MG: 10 INJECTION, SOLUTION EPIDURAL; INFILTRATION; INTRACAUDAL; PERINEURAL at 16:11

## 2023-05-16 RX ADMIN — PROPOFOL 100 MG: 10 INJECTION, EMULSION INTRAVENOUS at 16:12

## 2023-05-16 RX ADMIN — FENTANYL CITRATE 50 MCG: 50 INJECTION, SOLUTION INTRAMUSCULAR; INTRAVENOUS at 16:17

## 2023-05-16 RX ADMIN — Medication 100 MCG: at 16:29

## 2023-05-16 RX ADMIN — DEXAMETHASONE SODIUM PHOSPHATE 4 MG: 4 INJECTION, SOLUTION INTRAMUSCULAR; INTRAVENOUS at 16:23

## 2023-05-16 RX ADMIN — FENTANYL CITRATE 50 MCG: 50 INJECTION, SOLUTION INTRAMUSCULAR; INTRAVENOUS at 16:48

## 2023-05-16 RX ADMIN — ONDANSETRON 4 MG: 2 INJECTION INTRAMUSCULAR; INTRAVENOUS at 16:51

## 2023-05-16 RX ADMIN — CEFAZOLIN 2 G: 1 INJECTION, POWDER, FOR SOLUTION INTRAMUSCULAR; INTRAVENOUS at 16:20

## 2023-05-16 RX ADMIN — PROPOFOL 200 MG: 10 INJECTION, EMULSION INTRAVENOUS at 16:11

## 2023-05-16 NOTE — DISCHARGE INSTRUCTIONS
OK to dc home when recovered. Medications in chart  Pt will Follow up with Dr. Marcia Siegel as scheduled, 3-4 weeks. Call your doctor for the following:   Chills   Temperature greater than 101   Pain that is not tolerable despite taking pain medicine as ordered   There is increased swelling, redness or warmth at surgical site   There is increased drainage or bleeding from surgical site   Do not remove surgical dressing unless instructed to do so by your surgeon         No alcoholic beverages, no driving or operating machinery, no making important decisions for 24 hours. You may have a normal diet but should eat lightly day of surgery.   Drink plenty of fluids

## 2023-05-16 NOTE — OP NOTE
Operative Note      Patient: Lambert Hauser  YOB: 1970  MRN: [id number]    Date of Procedure: 5/16/2023    Pre-Op Diagnosis Codes:     * Right kidney stone [N20.0]    Post-Op Diagnosis: Same    Procedures:  Right extracorporeal shock wave lithotripsy    Surgeon(s):  Hank Mills MD    Assistant:   Jamar Ordaz MD    Anesthesia: General    Estimated Blood Loss (mL): Minimal    Complications: None    Specimens:   * No specimens in log *    Implants:  * No implants in log *      Drains: * No LDAs found *    Findings: Right renal stone fragmented very well on fluoroscopy    INDICATIONS FOR THE PROCEDURES:  Patient is a 48 y.o. female with a history of bilateral kidney stones. She presents today for extracorporeal shock wave lithotripsy. The risks and benefits of the procedure as well as possible alternatives and complications were discussed and she consented. DETAILS OF THE PROCEDURE:  Patient was correctly identified in the preoperative holding area. she was brought back to the operating room and placed under general endotracheal anesthesia. Prophylactic antibiotics were given in the form of Ancef 2gm IV. She was then placed in the supine position. The lithotriptor was positioned using fluoroscopic guidance. Shocks were administered as follows:  2 minute pause: Yes  Total Shocks: 3000  Frequency: 95/min  Voltage:1-7V    There was good fragmentation of the stone on fluoroscopy. The patient was awoken and sent to PACU for post-operative monitoring. Please note that Dr. Brendan Velasquez was present for all critical portions of the procedure. DISPOSITION:  Patient was discharged home in stable condition with appropriate follow-up in clinic in 3-4 weeks with KUB.     Jamar Ordaz MD  Urology Resident, PGY-2

## 2023-05-16 NOTE — H&P
for input(s): COLORU, PHUR, LABCAST, WBCUA, RBCUA, MUCUS, TRICHOMONAS, YEAST, BACTERIA, CLARITYU, SPECGRAV, LEUKOCYTESUR, UROBILINOGEN, Pollyann Christians in the last 72 hours. Invalid input(s): NITRATE, GLUCOSEUKETONESUAMORPHOUS    Culture Results:  @[facility]@      -----------------------------------------------------------------  Imaging Results:  XR ABDOMEN (KUB) (SINGLE AP VIEW)    Result Date: 3/29/2023  EXAMINATION: ONE SUPINE XRAY VIEW(S) OF THE ABDOMEN 3/29/2023 3:08 pm COMPARISON: December 6, 2022 HISTORY: ORDERING SYSTEM PROVIDED HISTORY: Kidney stone TECHNOLOGIST PROVIDED HISTORY: Reason for Exam: Pt c/o right flank pain and has hx kidney stones. Relevant Medical/Surgical History: kidney stones FINDINGS: Bowel gas pattern nonobstructed. Renal shadows partially obscured by bowel gas and fecal debris. No change in right nephrolithiasis. Probable left nephrolithiasis demonstrated on today's study. No definite ureteral stones. No acute osseous abnormality. Bilateral nephrolithiasis. US RENAL COMPLETE    Result Date: 3/29/2023  EXAMINATION: RETROPERITONEAL ULTRASOUND OF THE KIDNEYS AND URINARY BLADDER 3/29/2023 COMPARISON: None HISTORY: ORDERING SYSTEM PROVIDED HISTORY: Kidney stones TECHNOLOGIST PROVIDED HISTORY: Reason for Exam: Right flank pain, hx of renal stones FINDINGS: Kidneys: The right kidney measures 10.5 cm in length and the left kidney measures 10.8 cm in length. Normal renal cortical echogenicity. Nonobstructing bilateral nephrolithiasis. No hydronephrosis. Minimal non-specific prominence right renal pelvis. Nonobstructing bilateral nephrolithiasis. No hydronephrosis. Assessment and Plan    problem list:  Bilateral nephrolithiasis    Plan:  -OR today for right ESWL      Thank you for involving us in the care of Delma Rodrigues. Should you have any questions, please do not hesitate to contact us at any time.     Reginaldo Galindo MD  Urology Resident, PGY-2

## 2023-05-16 NOTE — ANESTHESIA POSTPROCEDURE EVALUATION
POST- ANESTHESIA EVALUATION       Pt Name: Roldan Dave  MRN: [id number]  YOB: 1970  Date of evaluation: 5/16/2023  Time:  6:21 PM      BP (!) 133/93   Pulse 78   Temp 98.1 °F (36.7 °C)   Resp 15   Ht 5' 2\" (1.575 m)   Wt 165 lb (74.8 kg)   LMP 12/07/2019 (Exact Date)   SpO2 97%   BMI 30.18 kg/m²      Consciousness Level  Awake  Cardiopulmonary Status  Stable  Pain Adequately Treated YES  Nausea / Vomiting  NO  Adequate Hydration  YES  Anesthesia Related Complications NONE      Electronically signed by Akbar Anderson MD on 5/16/2023 at 6:21 PM       Department of Anesthesiology  Postprocedure Note    Patient: Roldan Dave  MRN: [id number]  YOB: 1970  Date of evaluation: 5/16/2023      Procedure Summary     Date: 05/16/23 Room / Location: [address]    Anesthesia Start: 8770 Anesthesia Stop: 9409    Procedure: ESWL EXTRACORPOREAL SHOCK WAVE LITHOTRIPSY ([employer]-MED CONF# [id number]) (Right) Diagnosis:       Right kidney stone      (RIGHT KIDNEY STONE)    Surgeons: Venus Solis MD Responsible Provider: Akbar Anderson MD    Anesthesia Type: general ASA Status: 2          Anesthesia Type: No value filed.     Grayson Phase I: Grayson Score: 9    Grayson Phase II:        Anesthesia Post Evaluation

## 2023-05-16 NOTE — ANESTHESIA PRE PROCEDURE
Department of Anesthesiology  Preprocedure Note       Name:  Evaristo Schmitz   Age:  48 y.o.  :  1970                                          MRN:  [id number]         Date:  2023      Surgeon: Gina Dominguez):  Opal Chawla MD    Procedure: Procedure(s):  ESWL EXTRACORPOREAL SHOCK WAVE LITHOTRIPSY ([employer]-MED CONF# [id number])    Medications prior to admission:   Prior to Admission medications    Medication Sig Start Date End Date Taking?  Authorizing Provider   melatonin 3 MG TABS tablet Take 1 tablet by mouth nightly   Yes Historical Provider, MD   lisinopril-hydroCHLOROthiazide (PRINZIDE;ZESTORETIC) 10-12.5 MG per tablet Take 1 tablet by mouth daily 3/3/23 6/1/23  Anastasiia Harris DO   levothyroxine (SYNTHROID) 100 MCG tablet TAKE 1 TABLET IN THE MORNING 23   Anastasiia Harris DO   tamsulosin [facility]) 0.4 MG capsule TAKE 1 CAPSULE BY MOUTH DAILY  Patient not taking: Reported on 3/3/2023 1/5/23   Anastasiia Harris DO   fluticasone [facility]) 50 MCG/ACT nasal spray USE 2 SPRAYS NASALLY DAILY 1/3/23   Anastasiia Harris DO   ondansetron (ZOFRAN) 4 MG tablet Take 1 tablet by mouth every 8 hours as needed for Nausea or Vomiting  Patient not taking: Reported on 5/15/2023 11/7/22   CASSANDRA Rowe - CNP   dicyclomine (BENTYL) 20 MG tablet Take 1 tablet by mouth every 6 hours as needed (cramping) 22   Anastasiia Harris DO   simvastatin (ZOCOR) 40 MG tablet TAKE 1 TABLET DAILY WITH BREAKFAST 22   Anastasiia Harris DO   traZODone (DESYREL) 150 MG tablet TAKE ONE-HALF (1/2) TABLET NIGHTLY 22   Anastasiia Harris DO   Loratadine (CLARITIN PO) Take 10 mg by mouth daily     Historical Provider, MD       Current medications:    Current Facility-Administered Medications   Medication Dose Route Frequency Provider Last Rate Last Admin    lactated ringers IV soln infusion   IntraVENous Continuous Ocie Lennox,  mL/hr at 23 1252 New Bag at 23 1252

## 2023-06-09 ENCOUNTER — HOSPITAL ENCOUNTER (OUTPATIENT)
Facility: CLINIC | Age: 53
Discharge: HOME | End: 2023-06-09
Payer: COMMERCIAL

## 2023-06-09 ENCOUNTER — HOSPITAL ENCOUNTER (OUTPATIENT)
Dept: GENERAL RADIOLOGY | Facility: CLINIC | Age: 53
End: 2023-06-09
Payer: COMMERCIAL

## 2023-06-09 DIAGNOSIS — N20.0 KIDNEY STONES: ICD-10-CM

## 2023-06-09 PROCEDURE — 74018 RADEX ABDOMEN 1 VIEW: CPT

## 2023-06-18 DIAGNOSIS — K58.9 IRRITABLE BOWEL SYNDROME, UNSPECIFIED TYPE: ICD-10-CM

## 2023-06-19 ENCOUNTER — HOSPITAL ENCOUNTER (OUTPATIENT)
Age: 53
Setting detail: SPECIMEN
Discharge: HOME | End: 2023-06-19

## 2023-06-19 ENCOUNTER — TELEPHONE (OUTPATIENT)
Dept: FAMILY MEDICINE CLINIC | Age: 53
End: 2023-06-19

## 2023-06-19 ENCOUNTER — OFFICE VISIT (OUTPATIENT)
Dept: FAMILY MEDICINE CLINIC | Age: 53
End: 2023-06-19
Payer: COMMERCIAL

## 2023-06-19 VITALS
TEMPERATURE: 98 F | DIASTOLIC BLOOD PRESSURE: 78 MMHG | HEART RATE: 79 BPM | WEIGHT: 167 LBS | OXYGEN SATURATION: 97 % | SYSTOLIC BLOOD PRESSURE: 128 MMHG | BODY MASS INDEX: 30.54 KG/M2

## 2023-06-19 DIAGNOSIS — N20.0 BILATERAL NEPHROLITHIASIS: ICD-10-CM

## 2023-06-19 DIAGNOSIS — R30.0 DYSURIA: ICD-10-CM

## 2023-06-19 DIAGNOSIS — R30.0 DYSURIA: Primary | ICD-10-CM

## 2023-06-19 DIAGNOSIS — K58.9 IRRITABLE BOWEL SYNDROME, UNSPECIFIED TYPE: ICD-10-CM

## 2023-06-19 LAB
BILIRUBIN, POC: NORMAL
BLOOD URINE, POC: NORMAL
CLARITY, POC: CLEAR
COLOR, POC: YELLOW
GLUCOSE URINE, POC: NORMAL
KETONES, POC: NORMAL
LEUKOCYTE EST, POC: NORMAL
NITRITE, POC: NORMAL
PH, POC: 7
PROTEIN, POC: NORMAL
SPECIFIC GRAVITY, POC: 1.01
UROBILINOGEN, POC: 0.2

## 2023-06-19 PROCEDURE — G8417 CALC BMI ABV UP PARAM F/U: HCPCS

## 2023-06-19 PROCEDURE — 3017F COLORECTAL CA SCREEN DOC REV: CPT

## 2023-06-19 PROCEDURE — 3078F DIAST BP <80 MM HG: CPT

## 2023-06-19 PROCEDURE — G8427 DOCREV CUR MEDS BY ELIG CLIN: HCPCS

## 2023-06-19 PROCEDURE — 1036F TOBACCO NON-USER: CPT

## 2023-06-19 PROCEDURE — 81002 URINALYSIS NONAUTO W/O SCOPE: CPT

## 2023-06-19 PROCEDURE — 99214 OFFICE O/P EST MOD 30 MIN: CPT

## 2023-06-19 PROCEDURE — 3074F SYST BP LT 130 MM HG: CPT

## 2023-06-19 RX ORDER — DICYCLOMINE HCL 20 MG
20 TABLET ORAL EVERY 6 HOURS PRN
Qty: 30 TABLET | Refills: 0 | Status: SHIPPED | OUTPATIENT
Start: 2023-06-19

## 2023-06-19 RX ORDER — DICYCLOMINE HCL 20 MG
TABLET ORAL
Qty: 180 TABLET | Refills: 7 | OUTPATIENT
Start: 2023-06-19

## 2023-06-19 RX ORDER — ALPRAZOLAM 0.25 MG/1
TABLET ORAL
COMMUNITY
Start: 2023-03-28

## 2023-06-19 SDOH — ECONOMIC STABILITY: FOOD INSECURITY: WITHIN THE PAST 12 MONTHS, THE FOOD YOU BOUGHT JUST DIDN'T LAST AND YOU DIDN'T HAVE MONEY TO GET MORE.: NEVER TRUE

## 2023-06-19 SDOH — ECONOMIC STABILITY: FOOD INSECURITY: WITHIN THE PAST 12 MONTHS, YOU WORRIED THAT YOUR FOOD WOULD RUN OUT BEFORE YOU GOT MONEY TO BUY MORE.: NEVER TRUE

## 2023-06-19 SDOH — ECONOMIC STABILITY: INCOME INSECURITY: HOW HARD IS IT FOR YOU TO PAY FOR THE VERY BASICS LIKE FOOD, HOUSING, MEDICAL CARE, AND HEATING?: NOT HARD AT ALL

## 2023-06-19 ASSESSMENT — PATIENT HEALTH QUESTIONNAIRE - PHQ9
SUM OF ALL RESPONSES TO PHQ QUESTIONS 1-9: 1
5. POOR APPETITE OR OVEREATING: 0
8. MOVING OR SPEAKING SO SLOWLY THAT OTHER PEOPLE COULD HAVE NOTICED. OR THE OPPOSITE, BEING SO FIGETY OR RESTLESS THAT YOU HAVE BEEN MOVING AROUND A LOT MORE THAN USUAL: 0
3. TROUBLE FALLING OR STAYING ASLEEP: 0
4. FEELING TIRED OR HAVING LITTLE ENERGY: 1
6. FEELING BAD ABOUT YOURSELF - OR THAT YOU ARE A FAILURE OR HAVE LET YOURSELF OR YOUR FAMILY DOWN: 0
1. LITTLE INTEREST OR PLEASURE IN DOING THINGS: 0
2. FEELING DOWN, DEPRESSED OR HOPELESS: 0
SUM OF ALL RESPONSES TO PHQ9 QUESTIONS 1 & 2: 0
SUM OF ALL RESPONSES TO PHQ QUESTIONS 1-9: 1
7. TROUBLE CONCENTRATING ON THINGS, SUCH AS READING THE NEWSPAPER OR WATCHING TELEVISION: 0
SUM OF ALL RESPONSES TO PHQ QUESTIONS 1-9: 1
10. IF YOU CHECKED OFF ANY PROBLEMS, HOW DIFFICULT HAVE THESE PROBLEMS MADE IT FOR YOU TO DO YOUR WORK, TAKE CARE OF THINGS AT HOME, OR GET ALONG WITH OTHER PEOPLE: 0
9. THOUGHTS THAT YOU WOULD BE BETTER OFF DEAD, OR OF HURTING YOURSELF: 0
SUM OF ALL RESPONSES TO PHQ QUESTIONS 1-9: 1

## 2023-06-19 ASSESSMENT — ENCOUNTER SYMPTOMS
ABDOMINAL PAIN: 0
CHEST TIGHTNESS: 0
VOMITING: 0
COUGH: 0
SHORTNESS OF BREATH: 0
SORE THROAT: 0
EYE DISCHARGE: 0
COLOR CHANGE: 0
WHEEZING: 0
CONSTIPATION: 0
NAUSEA: 0
DIARRHEA: 0

## 2023-06-19 NOTE — TELEPHONE ENCOUNTER
Parker Briceno is calling to request a refill on the following medication(s):    Medication Request:  Requested Prescriptions     Pending Prescriptions Disp Refills    dicyclomine (BENTYL) 20 MG tablet 30 tablet 0     Sig: Take 1 tablet by mouth every 6 hours as needed (cramping)       Last Visit Date (If Applicable):  2/3/2322    Next Visit Date:    9/6/2023

## 2023-06-19 NOTE — PROGRESS NOTES
atraumatic. Right Ear: External ear normal.      Left Ear: External ear normal.      Nose: Nose normal.   Eyes:      General: No scleral icterus. Right eye: No discharge. Left eye: No discharge. Conjunctiva/sclera: Conjunctivae normal.      Pupils: Pupils are equal, round, and reactive to light. Neck:      Vascular: No carotid bruit. Trachea: No tracheal deviation. Cardiovascular:      Rate and Rhythm: Normal rate and regular rhythm. Heart sounds: Normal heart sounds. No murmur heard. No friction rub. No gallop. Pulmonary:      Effort: Pulmonary effort is normal. No tachypnea, accessory muscle usage or respiratory distress. Breath sounds: Normal breath sounds. No stridor. No decreased breath sounds, wheezing, rhonchi or rales. Abdominal:      Palpations: Abdomen is soft. Tenderness: There is abdominal tenderness in the suprapubic area. There is right CVA tenderness and left CVA tenderness. Musculoskeletal:         General: No tenderness or deformity. Normal range of motion. Cervical back: Normal range of motion and neck supple. Right lower leg: No edema. Left lower leg: No edema. Skin:     General: Skin is warm and dry. Coloration: Skin is not pale. Findings: No erythema or rash. Neurological:      Mental Status: She is alert and oriented to person, place, and time. GCS: GCS eye subscore is 4. GCS verbal subscore is 5. GCS motor subscore is 6. Gait: Gait is intact. Gait normal.   Psychiatric:         Speech: Speech normal.         Behavior: Behavior normal.         Thought Content: Thought content normal.         Judgment: Judgment normal.               ASSESSMENT/PLAN:  1. Dysuria  2. Bilateral nephrolithiasis  -no acute findings aside from expected hematuria on UA  -will send for culture and await results    -     POCT Urinalysis no Micro  -     Culture, Urine;  Future        Return if symptoms worsen or fail to

## 2023-06-20 LAB
MICROORGANISM SPEC CULT: NORMAL
SPECIMEN DESCRIPTION: NORMAL

## 2023-07-05 DIAGNOSIS — K58.9 IRRITABLE BOWEL SYNDROME, UNSPECIFIED TYPE: ICD-10-CM

## 2023-07-05 RX ORDER — DICYCLOMINE HCL 20 MG
20 TABLET ORAL EVERY 6 HOURS PRN
Qty: 90 TABLET | Refills: 0 | Status: SHIPPED | OUTPATIENT
Start: 2023-07-05 | End: 2023-10-03

## 2023-07-05 NOTE — TELEPHONE ENCOUNTER
Elliott Mtz is calling to request a refill on the following medication(s):    Medication Request:  Requested Prescriptions     Pending Prescriptions Disp Refills    dicyclomine (BENTYL) 20 MG tablet 90 tablet 0     Sig: Take 1 tablet by mouth every 6 hours as needed (cramping)       Last Visit Date (If Applicable):  6/2/1437    Next Visit Date:    9/6/2023

## 2023-07-17 ENCOUNTER — HOSPITAL ENCOUNTER (OUTPATIENT)
Dept: MAMMOGRAPHY | Age: 53
Discharge: HOME | End: 2023-07-19
Payer: COMMERCIAL

## 2023-07-17 DIAGNOSIS — R92.8 ABNORMAL MAMMOGRAM OF LEFT BREAST: ICD-10-CM

## 2023-07-17 DIAGNOSIS — Z12.31 ENCOUNTER FOR SCREENING MAMMOGRAM FOR MALIGNANT NEOPLASM OF BREAST: ICD-10-CM

## 2023-07-17 PROCEDURE — G0279 TOMOSYNTHESIS, MAMMO: HCPCS

## 2023-08-15 ENCOUNTER — HOSPITAL ENCOUNTER (OUTPATIENT)
Age: 53
Setting detail: SPECIMEN
Discharge: HOME | End: 2023-08-15

## 2023-08-15 DIAGNOSIS — E03.9 HYPOTHYROIDISM, UNSPECIFIED TYPE: ICD-10-CM

## 2023-08-15 DIAGNOSIS — I10 PRIMARY HYPERTENSION: Chronic | ICD-10-CM

## 2023-08-15 DIAGNOSIS — E78.2 MIXED HYPERLIPIDEMIA: Chronic | ICD-10-CM

## 2023-08-15 DIAGNOSIS — R73.01 IFG (IMPAIRED FASTING GLUCOSE): ICD-10-CM

## 2023-08-15 LAB
ALBUMIN SERPL-MCNC: 4.7 G/DL (ref 3.5–5.2)
ALBUMIN/GLOB SERPL: 1.8 {RATIO} (ref 1–2.5)
ALP SERPL-CCNC: 53 U/L (ref 35–104)
ALT SERPL-CCNC: 15 U/L (ref 5–33)
ANION GAP SERPL CALCULATED.3IONS-SCNC: 19 MMOL/L (ref 9–17)
AST SERPL-CCNC: 19 U/L
BASOPHILS # BLD: 0.04 K/UL (ref 0–0.2)
BASOPHILS NFR BLD: 1 % (ref 0–2)
BILIRUB SERPL-MCNC: 0.3 MG/DL (ref 0.3–1.2)
BUN SERPL-MCNC: 11 MG/DL (ref 6–20)
CALCIUM SERPL-MCNC: 9.7 MG/DL (ref 8.6–10.4)
CHLORIDE SERPL-SCNC: 104 MMOL/L (ref 98–107)
CHOLEST SERPL-MCNC: 205 MG/DL
CHOLESTEROL/HDL RATIO: 3.7
CO2 SERPL-SCNC: 19 MMOL/L (ref 20–31)
CREAT SERPL-MCNC: 1 MG/DL (ref 0.5–0.9)
EOSINOPHIL # BLD: 0.14 K/UL (ref 0–0.44)
EOSINOPHILS RELATIVE PERCENT: 2 % (ref 1–4)
ERYTHROCYTE [DISTWIDTH] IN BLOOD BY AUTOMATED COUNT: 12.2 % (ref 11.8–14.4)
EST. AVERAGE GLUCOSE BLD GHB EST-MCNC: 88 MG/DL
GFR SERPL CREATININE-BSD FRML MDRD: >60 ML/MIN/1.73M2
GLUCOSE SERPL-MCNC: 83 MG/DL (ref 70–99)
HBA1C MFR BLD: 4.7 % (ref 4–6)
HCT VFR BLD AUTO: 42.1 % (ref 36.3–47.1)
HDLC SERPL-MCNC: 56 MG/DL
HGB BLD-MCNC: 14.2 G/DL (ref 11.9–15.1)
IMM GRANULOCYTES # BLD AUTO: 0.03 K/UL (ref 0–0.3)
IMM GRANULOCYTES NFR BLD: 1 %
LDLC SERPL CALC-MCNC: 120 MG/DL (ref 0–130)
LYMPHOCYTES NFR BLD: 1.07 K/UL (ref 1.1–3.7)
LYMPHOCYTES RELATIVE PERCENT: 17 % (ref 24–43)
MCH RBC QN AUTO: 33.3 PG (ref 25.2–33.5)
MCHC RBC AUTO-ENTMCNC: 33.7 G/DL (ref 28.4–34.8)
MCV RBC AUTO: 98.8 FL (ref 82.6–102.9)
MONOCYTES NFR BLD: 0.45 K/UL (ref 0.1–1.2)
MONOCYTES NFR BLD: 7 % (ref 3–12)
NEUTROPHILS NFR BLD: 72 % (ref 36–65)
NEUTS SEG NFR BLD: 4.76 K/UL (ref 1.5–8.1)
NRBC BLD-RTO: 0 PER 100 WBC
PLATELET # BLD AUTO: 312 K/UL (ref 138–453)
PMV BLD AUTO: 10.7 FL (ref 8.1–13.5)
POTASSIUM SERPL-SCNC: 4.2 MMOL/L (ref 3.7–5.3)
PROT SERPL-MCNC: 7.3 G/DL (ref 6.4–8.3)
RBC # BLD AUTO: 4.26 M/UL (ref 3.95–5.11)
SODIUM SERPL-SCNC: 142 MMOL/L (ref 135–144)
TRIGL SERPL-MCNC: 145 MG/DL
TSH SERPL DL<=0.05 MIU/L-ACNC: 3.2 UIU/ML (ref 0.3–5)
WBC OTHER # BLD: 6.5 K/UL (ref 3.5–11.3)

## 2023-08-28 DIAGNOSIS — G47.9 SLEEP TROUBLE: ICD-10-CM

## 2023-08-28 NOTE — TELEPHONE ENCOUNTER
Margaret Glez is calling to request a refill on the following medication(s):    Medication Request:  Requested Prescriptions     Pending Prescriptions Disp Refills    traZODone (DESYREL) 150 MG tablet [Pharmacy Med Name: TRAZODONE HCL TABS 150MG] 90 tablet 3     Sig: TAKE ONE-HALF (1/2) TABLET NIGHTLY       Last Visit Date (If Applicable):  6/6/6378    Next Visit Date:    9/6/2023

## 2023-08-29 RX ORDER — TRAZODONE HYDROCHLORIDE 150 MG/1
TABLET ORAL
Qty: 90 TABLET | Refills: 3 | Status: SHIPPED | OUTPATIENT
Start: 2023-08-29

## 2023-09-06 ENCOUNTER — OFFICE VISIT (OUTPATIENT)
Dept: FAMILY MEDICINE CLINIC | Age: 53
End: 2023-09-06
Payer: COMMERCIAL

## 2023-09-06 ENCOUNTER — HOSPITAL ENCOUNTER (OUTPATIENT)
Age: 53
Discharge: HOME | End: 2023-09-08
Payer: COMMERCIAL

## 2023-09-06 ENCOUNTER — HOSPITAL ENCOUNTER (OUTPATIENT)
Dept: GENERAL RADIOLOGY | Age: 53
Discharge: HOME | End: 2023-09-08
Payer: COMMERCIAL

## 2023-09-06 VITALS
OXYGEN SATURATION: 96 % | TEMPERATURE: 97.6 F | BODY MASS INDEX: 30.65 KG/M2 | WEIGHT: 167.6 LBS | DIASTOLIC BLOOD PRESSURE: 80 MMHG | SYSTOLIC BLOOD PRESSURE: 130 MMHG | HEART RATE: 97 BPM

## 2023-09-06 DIAGNOSIS — M54.50 ACUTE RIGHT-SIDED LOW BACK PAIN WITHOUT SCIATICA: ICD-10-CM

## 2023-09-06 DIAGNOSIS — I10 PRIMARY HYPERTENSION: Primary | Chronic | ICD-10-CM

## 2023-09-06 DIAGNOSIS — N20.0 NEPHROLITHIASIS: ICD-10-CM

## 2023-09-06 DIAGNOSIS — K58.0 IRRITABLE BOWEL SYNDROME WITH DIARRHEA: ICD-10-CM

## 2023-09-06 DIAGNOSIS — E03.9 HYPOTHYROIDISM, UNSPECIFIED TYPE: ICD-10-CM

## 2023-09-06 DIAGNOSIS — E78.2 MIXED HYPERLIPIDEMIA: Chronic | ICD-10-CM

## 2023-09-06 DIAGNOSIS — K58.9 IRRITABLE BOWEL SYNDROME, UNSPECIFIED TYPE: ICD-10-CM

## 2023-09-06 PROCEDURE — G8417 CALC BMI ABV UP PARAM F/U: HCPCS | Performed by: FAMILY MEDICINE

## 2023-09-06 PROCEDURE — 3078F DIAST BP <80 MM HG: CPT | Performed by: FAMILY MEDICINE

## 2023-09-06 PROCEDURE — G8427 DOCREV CUR MEDS BY ELIG CLIN: HCPCS | Performed by: FAMILY MEDICINE

## 2023-09-06 PROCEDURE — 99214 OFFICE O/P EST MOD 30 MIN: CPT | Performed by: FAMILY MEDICINE

## 2023-09-06 PROCEDURE — 3074F SYST BP LT 130 MM HG: CPT | Performed by: FAMILY MEDICINE

## 2023-09-06 PROCEDURE — 1036F TOBACCO NON-USER: CPT | Performed by: FAMILY MEDICINE

## 2023-09-06 PROCEDURE — 72100 X-RAY EXAM L-S SPINE 2/3 VWS: CPT

## 2023-09-06 PROCEDURE — 3017F COLORECTAL CA SCREEN DOC REV: CPT | Performed by: FAMILY MEDICINE

## 2023-09-06 RX ORDER — DICYCLOMINE HCL 20 MG
20 TABLET ORAL 2 TIMES DAILY
Qty: 180 TABLET | Refills: 1 | Status: SHIPPED | OUTPATIENT
Start: 2023-09-06 | End: 2024-03-04

## 2023-09-06 ASSESSMENT — PATIENT HEALTH QUESTIONNAIRE - PHQ9
1. LITTLE INTEREST OR PLEASURE IN DOING THINGS: 0
SUM OF ALL RESPONSES TO PHQ QUESTIONS 1-9: 0
SUM OF ALL RESPONSES TO PHQ QUESTIONS 1-9: 0
3. TROUBLE FALLING OR STAYING ASLEEP: 0
10. IF YOU CHECKED OFF ANY PROBLEMS, HOW DIFFICULT HAVE THESE PROBLEMS MADE IT FOR YOU TO DO YOUR WORK, TAKE CARE OF THINGS AT HOME, OR GET ALONG WITH OTHER PEOPLE: 0
SUM OF ALL RESPONSES TO PHQ QUESTIONS 1-9: 0
7. TROUBLE CONCENTRATING ON THINGS, SUCH AS READING THE NEWSPAPER OR WATCHING TELEVISION: 0
2. FEELING DOWN, DEPRESSED OR HOPELESS: 0
SUM OF ALL RESPONSES TO PHQ QUESTIONS 1-9: 0
6. FEELING BAD ABOUT YOURSELF - OR THAT YOU ARE A FAILURE OR HAVE LET YOURSELF OR YOUR FAMILY DOWN: 0
4. FEELING TIRED OR HAVING LITTLE ENERGY: 0
5. POOR APPETITE OR OVEREATING: 0
SUM OF ALL RESPONSES TO PHQ9 QUESTIONS 1 & 2: 0
8. MOVING OR SPEAKING SO SLOWLY THAT OTHER PEOPLE COULD HAVE NOTICED. OR THE OPPOSITE, BEING SO FIGETY OR RESTLESS THAT YOU HAVE BEEN MOVING AROUND A LOT MORE THAN USUAL: 0
9. THOUGHTS THAT YOU WOULD BE BETTER OFF DEAD, OR OF HURTING YOURSELF: 0

## 2023-09-06 ASSESSMENT — ENCOUNTER SYMPTOMS
BACK PAIN: 1
GASTROINTESTINAL NEGATIVE: 1
EYES NEGATIVE: 1
BOWEL INCONTINENCE: 0
RESPIRATORY NEGATIVE: 1
ALLERGIC/IMMUNOLOGIC NEGATIVE: 1
ABDOMINAL PAIN: 0

## 2023-09-06 NOTE — PROGRESS NOTES
Right eye: No discharge. Left eye: No discharge. Extraocular Movements: Extraocular movements intact. Conjunctiva/sclera: Conjunctivae normal.   Cardiovascular:      Rate and Rhythm: Normal rate and regular rhythm. Pulses: Normal pulses. Heart sounds: Normal heart sounds. No murmur heard. No friction rub. No gallop. Pulmonary:      Effort: Pulmonary effort is normal. No respiratory distress. Breath sounds: Normal breath sounds. No stridor. No wheezing, rhonchi or rales. Chest:      Chest wall: No tenderness. Musculoskeletal:      Lumbar back: Spasms and tenderness present. Back:    Neurological:      Mental Status: She is alert and oriented to person, place, and time. Mental status is at baseline. Deep Tendon Reflexes:      Reflex Scores:       Patellar reflexes are 2+ on the right side and 2+ on the left side. Achilles reflexes are 2+ on the right side and 2+ on the left side. Psychiatric:         Mood and Affect: Mood normal.         Behavior: Behavior normal.         Thought Content: Thought content normal.         Judgment: Judgment normal.       Labs/Imaging/Diagnostics   Labs:  Hemoglobin A1C   Date Value Ref Range Status   08/15/2023 4.7 4.0 - 6.0 % Final       Imaging Last 24 Hours:  [facility] CARLO DIGITAL DIAGNOSTIC UNILATERAL LEFT  Narrative: EXAMINATION:  DIAGNOSTIC DIGITAL LEFT BREAST MAMMOGRAM WITH TOMOSYNTHESIS, 7/17/2023   10:19  am    TECHNIQUE:  Diagnostic mammography of the left breast was performed with   tomosynthesis.   2D standard and 3D tomosynthesis combination imaging performed through the  left breast.  Computer aided detection was utilized in the interpretation   of  this exam.    Views: 90 degree lateral, spot compression CC and MLO    COMPARISON:  6 January 2023, 3 December 2022, 4 November 2021    HISTORY:  ORDERING SYSTEM PROVIDED HISTORY: Abnormal mammogram of left breast  TECHNOLOGIST PROVIDED HISTORY:  Is the patient

## 2023-09-07 ASSESSMENT — ENCOUNTER SYMPTOMS
BLURRED VISION: 0
ORTHOPNEA: 0
SHORTNESS OF BREATH: 0

## 2023-09-18 DIAGNOSIS — M54.50 ACUTE RIGHT-SIDED LOW BACK PAIN WITHOUT SCIATICA: Primary | ICD-10-CM

## 2023-09-27 ENCOUNTER — HOSPITAL ENCOUNTER (OUTPATIENT)
Dept: PHYSICAL THERAPY | Facility: CLINIC | Age: 53
Setting detail: THERAPIES SERIES
Discharge: HOME | End: 2023-09-27
Payer: COMMERCIAL

## 2023-09-27 PROCEDURE — 97110 THERAPEUTIC EXERCISES: CPT

## 2023-09-27 PROCEDURE — 97161 PT EVAL LOW COMPLEX 20 MIN: CPT

## 2023-09-27 NOTE — CONSULTS
[] [facility]) - [facility] &  Therapy  [address] [facility].  [phone]  F: [phone] [x] [address] [facility]  [address] [place] [address] Rd   [address]  P: [phone]  F: [phone] [] [address] &  Therapy  [address]  P: [phone]  F: [phone] [] Luan Manuel  P: [phone]  F: [phone] [] [address] [place] [address]  [address] [facility]   [address]   [place]: [phone]  F: [phone]          Physical Therapy Spine Evaluation    Date:  2023  Patient: Sonny Keene  : 1970  MRN: [id number]  Physician: Lenore Bermudez DO    Insurance: [employer] (20 vs)   Medical Diagnosis: M54.50 (ICD-10-CM) - Acute right-sided low back pain without sciatica  Rehab Codes: M54.59, M25.551, M25.651, M25.652, R29.3, M62.81  Onset Date: 23  Next 's appt.: 2024      Subjective:   Pt arrived to physical therapy with c/o chronic low back pain primarily on R-side for several years. Pt reported at end of July went to [facility] [place] with , was walking about less than a mile on a trail with different elevations which caused pain radiating to R lateral hip for the first time. Pt reported tried doing some stretches without relief noted. Pt denied surgical interventions or injection in the past to manage back pain. Pt denied saddle paresthesia. Pt denied radicular symptoms to BLE. Pt noted HP temporary alleviate symptoms. Motrin has not relieve much pain. Pt denied fall or instability with ambulation. Pain present?  [x] Yes  [] No    Location  R-sided LB to lateral hip    Pain Rating currently  (0-10 scale)   2/10   Pain at worse 7/10   Pain at best 0/10   Description of pain Intermittent achy    Altered Sensation None    What makes it worse Sit in

## 2023-10-02 ENCOUNTER — HOSPITAL ENCOUNTER (OUTPATIENT)
Dept: PHYSICAL THERAPY | Facility: CLINIC | Age: 53
Setting detail: THERAPIES SERIES
Discharge: HOME | End: 2023-10-02
Payer: COMMERCIAL

## 2023-10-02 NOTE — FLOWSHEET NOTE
[] [address]  [address] [facility].  [phone]  F: [phone] [x] 204 [facility]  [address]   [address]  P: [phone]  F: [phone] [] [address]  [address] [facility]  P: [phone]  F: [phone] [] Luan Rubyie: [phone]  F: [phone] [] [address] [facility]  [address] [facility]   [address]   P: [phone]  F: [phone]  [] [address] [facility].   P: [phone]  F: [phone] [] 205 [facility]  [address]   [address]  P: [phone]  F: [phone] [] [address] [facility]  [address] [facility]  [place]: [phone]  F: [phone] [] [address]  [place]: [phone]  F: [phone]      Therapy Cancel/No Show note    Date: 10/2/2023  Patient: Alcides Cook  : 1970  MRN: [id number]    Cancels/No Shows to date: 0/0-  not counted against    For today's appointment patient:    [x]  Cancelled    [] Rescheduled appointment    [] No-show     Reason given by patient:    []  Patient ill    []  Conflicting appointment    [] No transportation      [] Conflict with work    [] No reason given    [] Weather related    [] [id number]    [x] Other:   provider off    Comments:        [x] Next appointment was confirmed 10/3/23    Electronically signed by: Ayla Jaramillo PT

## 2023-10-03 ENCOUNTER — HOSPITAL ENCOUNTER (OUTPATIENT)
Dept: PHYSICAL THERAPY | Facility: CLINIC | Age: 53
Setting detail: THERAPIES SERIES
Discharge: HOME | End: 2023-10-03
Payer: COMMERCIAL

## 2023-10-03 PROCEDURE — 97110 THERAPEUTIC EXERCISES: CPT

## 2023-10-03 NOTE — FLOWSHEET NOTE
[x] 630 [facility]  Outpatient Rehabilitation &  Therapy  [address]   [address]  P: [phone]  F: [phone]     Physical Therapy Daily Treatment Note    Date:  10/3/2023  Patient Name:  Augusta Rome    :  1970  MRN: [id number]  Physician: Vania Bowser DO                        Insurance: [employer] (20 vs)   Medical Diagnosis: M54.50 (ICD-10-CM) - Acute right-sided low back pain without sciatica  Rehab Codes: M54.59, M25.551, M25.651, M25.652, R29.3, M62.81  Onset Date: 23               Next 's appt.: 2024     Visit# / total visits: 2/10     Cancels/No Shows: 0/0    Subjective:    Pain:  [] Yes  [x] No Location: (R) low back Pain Rating: (0-10 scale) discomfort/10  Pain altered Tx:  [x] No  [] Yes  Action:  Comments: pt arrives reporting her back pain isn't too bad today.     Objective:  Modalities:   Precautions:  Exercises:  Exercise Reps/ Time Weight/ Level Comments   Sci-fit 5'       Supine:         LTR 10x5s ea       IR piriformis stretch 2x30\" ea     PPT  10x5s   Max cues    B HS stretch 30s ea  strap     Hooklying clamshells 2x10  lime     Bridges  2x10 lime    Modified dead bugs  - alternating arms  - alternating leg extensions  - opp arm/opp legs 10x ea  Hooklyling position  Small red ball   Isometric deadbug holds 3x10\"  Stability ball                Sidelying:         Open book  10x5s ea        Clamshells 2x10 ea lime    Hip abduction 2x10 ea lime          Prone       Prone press ups 10x     Hip extension 2x10 ea lime          Standing       Shoulder extension 2x10 lime    Paloff press 10x ea Double lime    Step ups with knee drive 20L ea 6\"    Other:    Specific Instructions for next treatment:  - Improve core stabilizers and B hip strength  - Improve tissue extensibility of thoracolumbar paraspinals, hamstrings, gastroc/soleus  - Single leg stability training  - Manual to thoracolumbar paraspinals as needed  - CP/HP prn             Treatment

## 2023-10-05 ENCOUNTER — HOSPITAL ENCOUNTER (OUTPATIENT)
Dept: PHYSICAL THERAPY | Facility: CLINIC | Age: 53
Setting detail: THERAPIES SERIES
Discharge: HOME | End: 2023-10-05
Payer: COMMERCIAL

## 2023-10-05 PROCEDURE — 97110 THERAPEUTIC EXERCISES: CPT

## 2023-10-05 NOTE — FLOWSHEET NOTE
[x] 630 [facility]  Outpatient Rehabilitation &  Therapy  [address]   [address]  P: [phone]  F: [phone]     Physical Therapy Daily Treatment Note    Date:  10/5/2023  Patient Name:  Jamila Ceja    :  1970  MRN: [id number]  Physician: Cheyanne Shields DO                        Insurance: [employer] (20 vs)   Medical Diagnosis: M54.50 (ICD-10-CM) - Acute right-sided low back pain without sciatica  Rehab Codes: M54.59, M25.551, M25.651, M25.652, R29.3, M62.81  Onset Date: 23               Next 's appt.: 2024     Visit# / total visits: 310     Cancels/No Shows: 0/0    Subjective:    Pain:  [] Yes  [x] No Location: (R) low back Pain Rating: (0-10 scale) 0/10  Pain altered Tx:  [x] No  [] Yes  Action:  Comments: pt arrives reporting her back feels \"pretty good\" today.      Objective:  Modalities:   Precautions:  Exercises:  Exercise Reps/ Time Weight/ Level Comments   Sci-fit 5'       Supine:         LTR 10x5s ea       IR piriformis stretch 2x30\" ea     PPT  10x5s   Max cues    B HS stretch 2x30s ea  strap  inc reps 10/5   Hooklying clamshells 2x10  Blue   inc band 10/5   Bridges  2x10 Blue  Inc band 10/5   Modified dead bugs  - alternating arms  - alternating leg extensions  - opp arm/opp legs 10x ea  Hooklyling position  Small red ball   Isometric deadbug holds 3x10\"  Stability ball          Quadruped      Bird dogs  10x ea  New 10/5         Sidelying:         Open book  10x5s ea        Clamshells 2x10 ea Blue  Inc band 10/5   Hip abduction 2x10 ea Blue  Inc band 10/5         Prone       Prone press ups 10x     Hip extension 2x10 ea Blue  Inc band 10/5         Standing       Shoulder extension 2x10 Blue  Inc band 10/5   Paloff press 10x ea Double lime    Step ups with knee drive 30L ea 6\"    Lateral step ups with knee drive  66D ea 6\" New 10/   3- way hip  10x ea Lime  New 10/5   SB stretch  2x30\"  New 10/   SLS  30\" ea  New 10/5   SLS + foam  3x30\" ea   New

## 2023-10-09 ENCOUNTER — HOSPITAL ENCOUNTER (OUTPATIENT)
Dept: PHYSICAL THERAPY | Facility: CLINIC | Age: 53
Setting detail: THERAPIES SERIES
Discharge: HOME | End: 2023-10-09
Payer: COMMERCIAL

## 2023-10-09 PROCEDURE — 97110 THERAPEUTIC EXERCISES: CPT

## 2023-10-09 NOTE — FLOWSHEET NOTE
Standing       Shoulder extension 2x10 Lime   Inc band 10/5; regressed 10/9    Paloff press 10x2 ea Double lime Inc set 10/9    Step ups with knee drive 48L ea 6\"    Lateral step ups with knee drive  11R ea 6\" New 10/5   3- way hip  10x ea Lime  New 10/5   SB stretch  2x30\"  New 10/5   SLS  30\" ea  New 10/5   SLS + foam  3x30\" ea   New 10/5         Other:    Specific Instructions for next treatment:  - Improve core stabilizers and B hip strength  - Improve tissue extensibility of thoracolumbar paraspinals, hamstrings, gastroc/soleus  - Single leg stability training  - Manual to thoracolumbar paraspinals as needed  - CP/HP prn           Treatment Charges: Mins Units   []  Modalities     [x]  Ther Exercise 50 3   []  Manual Therapy     []  Ther Activities     []  Neuro Re-ed     []  Vasocompression     [] Gait     []  Other     Total Treatment time 50 3       Assessment: [x] Progressing toward goals. Began session with TrueBike warm up to promote tissue extensibility and peripheral blood flow to BLE. Cues provided throughout session for slow and controlled movement to prevent lumbar strain and promote core stabilizers strength with progressive carryover noted. Attempted dead bug in 90/90 leg lift however pt reported inc back pain therefore resumed to hooklying position with improved tolerance noted. Incorporated quadruped fire hydrant and iso hip add + weighed ball overhead to challenge core stability with moderate cues provided to ensure proper pelvic stability. Regressed resistance with shoulder ext to ensure proper core stabilization and prevent increased cervicothoracic flexion compensation with fair carryover noted. Will continue to monitor body mechanics and modify progressive therex program as appropriate to prevent injury. [] No change.      [] Other:  [x] Patient would continue to benefit from skilled physical therapy services in order to: manage pain, improve thoracolumbar paraspinals

## 2023-10-11 ENCOUNTER — HOSPITAL ENCOUNTER (OUTPATIENT)
Dept: PHYSICAL THERAPY | Facility: CLINIC | Age: 53
Setting detail: THERAPIES SERIES
Discharge: HOME | End: 2023-10-11
Payer: COMMERCIAL

## 2023-10-11 PROCEDURE — 97110 THERAPEUTIC EXERCISES: CPT

## 2023-10-11 NOTE — FLOWSHEET NOTE
current frequency toward long and short term goals. [x] Specific Instructions for subsequent treatments: see above      Time In: 3:57 pm            Time Out: 4:00 pm    Electronically signed by:   An Chip Sever, PT

## 2023-10-17 ENCOUNTER — HOSPITAL ENCOUNTER (OUTPATIENT)
Dept: PHYSICAL THERAPY | Facility: CLINIC | Age: 53
Setting detail: THERAPIES SERIES
Discharge: HOME | End: 2023-10-17
Payer: COMMERCIAL

## 2023-10-17 PROCEDURE — 97110 THERAPEUTIC EXERCISES: CPT

## 2023-10-17 NOTE — FLOWSHEET NOTE
[x] 630 [facility]  Outpatient Rehabilitation &  Therapy  [address]   [address]  P: [phone]  F: [phone]     Physical Therapy Daily Treatment Note    Date:  10/17/2023  Patient Name:  Judy Fang    :  1970  MRN: [id number]  Physician: Latrice Posada DO                        Insurance: [email or web address] (20 vs)   Medical Diagnosis: M54.50 (ICD-10-CM) - Acute right-sided low back pain without sciatica  Rehab Codes: M54.59, M25.551, M25.651, M25.652, R29.3, M62.81  Onset Date: 23               Next 's appt.: 2024  Visit# / total visits: 6/10     Cancels/No Shows: 0/0      Subjective:    Pain:  [x] Yes  [] No Location: R-sided LB  Pain Rating: (0-10 scale) 1/10  Pain altered Tx:  [x] No  [] Yes  Action:   Comments: Was a little sore after last session but not bad. Did not complete updated HEP as frequently as she'd like as she caught a stomach bug over the weekend. Presents with minimal pain this date.       Objective:  Modalities:   Precautions:  Exercises: bold = completed 10/17/2023   Exercise Reps/ Time Weight/ Level Comments   Sci-fit 5'       TrueBike  6' Lvl 4 New 10/9         Supine:         LTR 10x5s ea       DKTC 5x10s   New 10/11    IR piriformis stretch 2x30\" ea     PPT  10x5s   Max cues    B HS stretch 2x30s ea  strap  inc reps 10/5   Hooklying clamshells 2x10  Blue   inc band 10/5   [employer] band 10/5; incr reps 10/17   Bridge 2 up 1 down  10x ea  New 10/11    Modified dead bugs  - alternating arms  - alternating leg extensions  - opp arm/opp legs 10x ea  Hooklyling position  Small red ball   Isometric deadbug holds 3x10\"  Stability ball    TrA + marching  2x10  Blue  New 10/9    Iso hip add + ball overhead  2x10 4# MB New 10/9    TrA + alt knee fallout 2x10 ea  Blue  New 10/11 ; incr res 10/17         Quadruped:      Bird dogs  10x ea  New 10/5   Fire hydrant  2x10 Blue  New 10/5          Sidelying:         Open book  10x5s ea

## 2023-10-19 ENCOUNTER — APPOINTMENT (OUTPATIENT)
Dept: PHYSICAL THERAPY | Facility: CLINIC | Age: 53
End: 2023-10-19
Payer: COMMERCIAL

## 2023-10-23 ENCOUNTER — HOSPITAL ENCOUNTER (OUTPATIENT)
Dept: PHYSICAL THERAPY | Facility: CLINIC | Age: 53
Setting detail: THERAPIES SERIES
Discharge: HOME | End: 2023-10-23
Payer: COMMERCIAL

## 2023-10-23 PROCEDURE — 97110 THERAPEUTIC EXERCISES: CPT

## 2023-10-23 NOTE — FLOWSHEET NOTE
cues.  Pt will improve Oswestry score from 24% impaired to less than 12% impaired to demo improved functional mobility to participate in daily functional activities     Patient goals: \"not to be in pain\"        Pt. Education:  [x] Yes  [] No  [x] Reviewed Prior HEP/Ed  Method of Education: [x] Verbal form as needed  [x] Demo  [] Written  Access Code: [email or web address]  URL: [email or web address]  Date: 09/27/2023  Prepared by: An Farrah Dave  - Supine Lower Trunk Rotation  - 1 x daily - 7 x weekly - 1 sets - 10 reps - 5sec hold  - Sidelying Thoracic Rotation with Open Book  - 1 x daily - 7 x weekly - 1 sets - 10 reps - 5sec hold  - Supine Posterior Pelvic Tilt  - 1 x daily - 7 x weekly - 2 sets - 10 reps - 5sec hold  - Supine Hamstring Stretch with Strap  - 1 x daily - 7 x weekly - 1 sets - 3 reps - 30sec hold    10/3- lime green band provided  - Clam with Resistance  - 1 x daily - 2 sets - 10 reps  - Sidelying Hip Abduction with Resistance at Thighs  - 1 x daily - 2 sets - 10 reps  - Prone Press Up  - 1 x daily - 10 reps  - Prone Hip Extension with Resistance Loop  - 1 x daily - 2 sets - 10 reps      Date: 10/11/2023 (issued blue TB)   Prepared by: An Farrah Dave  Exercises  - Supine Double Knee to Chest  - 1 x daily - 7 x weekly - 1 sets - 10 reps - 10sec hold  - Supine March with Resistance Band  - 1 x daily - 7 x weekly - 2 sets - 10 reps  - Bent Knee Fallouts with Alternating Legs  - 1 x daily - 7 x weekly - 2 sets - 10 reps  - Standing Hip Abduction with Resistance at Ankles and Counter Support  - 1 x daily - 7 x weekly - 2 sets - 10 reps  - Standing Hip Extension with Resistance at Ankles and Counter Support  - 1 x daily - 7 x weekly - 2 sets - 10 reps       Comprehension of Education:  [x] Verbalizes understanding. [x] Demonstrates understanding. [] Needs review. [] Demonstrates/verbalizes HEP/Ed previously given. Plan: [x] Continue current frequency toward long and short term goals.     [x] Specific Instructions

## 2023-10-30 ENCOUNTER — APPOINTMENT (OUTPATIENT)
Dept: PHYSICAL THERAPY | Facility: CLINIC | Age: 53
End: 2023-10-30
Payer: COMMERCIAL

## 2023-11-01 ENCOUNTER — HOSPITAL ENCOUNTER (OUTPATIENT)
Dept: PHYSICAL THERAPY | Facility: CLINIC | Age: 53
Setting detail: THERAPIES SERIES
Discharge: HOME | End: 2023-11-01

## 2023-11-01 NOTE — FLOWSHEET NOTE
[] [address]  [address] [facility].  [phone]  F: [phone] [x] [address] [facility]  [address]   [address]  P: [phone]  F: [phone] [] [address]  [address] [facility]  P: [phone]  F: [phone] [] Luan Rubyie: [phone]  F: [phone] [] [address] [facility]  [address] [facility]   [address]   P: [phone]  F: [phone]  [] [address] [facility].   P: [phone]  F: [phone] [] [address] [facility]  [address] [facility]  P: [phone]  F: [phone] [] [address] [facility]  [address] [facility]  Elverna Baez: [phone]  F: [phone] [] [address]  Elverna Baez: [phone]  F: [phone]      Therapy Cancel/No Show note    Date: 2023  Patient: Jaimie Lara  : 1970  MRN: [id number]    Cancels/No Shows to date:     For today's appointment patient:    [x]  Cancelled    [] Rescheduled appointment    [] No-show     Reason given by patient:    []  Patient ill    []  Conflicting appointment    [] No transportation      [] Conflict with work    [x] No reason given    [] Weather related    [] COVID-19    [] Other:      Comments:        [x] Next appointment was confirmed    Electronically signed by:  Ashley Cedeno, PT

## 2023-11-06 ENCOUNTER — HOSPITAL ENCOUNTER (OUTPATIENT)
Dept: PHYSICAL THERAPY | Facility: CLINIC | Age: 53
Setting detail: THERAPIES SERIES
Discharge: HOME | End: 2023-11-06
Payer: COMMERCIAL

## 2023-11-06 PROCEDURE — 97110 THERAPEUTIC EXERCISES: CPT

## 2023-11-06 NOTE — DISCHARGE SUMMARY
[] [address]  [address] [facility].  [phone]  F: [phone] [x] 204 [facility]  [address]   [address]  P: [phone]  F: [phone] [] [address]  [address] [facility]  P: [phone]  F: [phone] [] [place]: [phone]  F: [phone] [] [address] [employer]  [address] [facility]   [address]   P: [phone]  F: [phone]  [] [address] [facility].   P: [phone]  F: [phone] [] [address] [facility]  [address]  P: [phone]  F: [phone] [] [address] [facility]  [address] [facility]  [place]: [phone]  F: [phone] [] [address]  [place]: [phone]  F: [phone]      Physical Therapy Discharge Note    Date: 2023      Patient: Hodan Low  : 1970  MRN: [id number]    Physician: Deidre Hinds DO                        Insurance: [facility] (20 vs)   Medical Diagnosis: M54.50 (ICD-10-CM) - Acute right-sided low back pain without sciatica  Rehab Codes: M54.59, M25.551, M25.651, M25.652, R29.3, M62.81  Onset Date: 23               Next 's appt.: 2024  Total visits attended: 8  Cancels/No shows: 1/0  Date of initial visit: 23                Date of final visit: 23       Subjective:    Pain:  [x] Yes  [] No               Location: R lateral hip                         Pain Rating: (0-10 scale) 0/10 low back, 2/10 R lateral hip   Pain altered Tx:  [x] No  [] Yes  Action:

## 2023-11-06 NOTE — FLOWSHEET NOTE
this date 11/6/23       Objective: Re-assessed on 11/6/23 by An Cynthia Gregorio, PT         STRENGTH     Left Right   L1-2 Hip Flex 5/5 5/5   Hip Abd 4+/5 4+/5   Hip Ext - KE 4+/5 4+/5   Hip Ext - KF 4+/5 4+/5   Abdominals fair fair   Erector Spinae fair fair           *painful            ROM   Lumbar     Flexion [facility]   Rotation L WFL R WFL   Sidebend L WFL  R WFL            Functional Test: Oswestry  Score: 12/50 or 24% functionally impaired  11/6/23: 1/50 or 2% functionally impaired            Assessment: [x] Progressing toward goals. Re-assessment of goals and overall function this date to prepare for discharged. Pt reported pain level increased up to 2/10 at most with daily physical activities. Pt demonstrated improved B hip strength globally and hamstrings flexibility, improve lumbar tissue extensibility in all directions, improved core function and pelvic stability, as well as improved overall endurance and functional strength of BLE. Pt only able to maintain SLS eyes close in 3-6 sec bilaterally - will benefit from proprioception/balance training in HEP. Issued and reviewed updated HEP to ensure proper execution with minimal cues provided. Pt was educated on importance and benefits of completing HEP in a consistent manner, as well as modifying intensity and exercise program as needed to be time efficiency with verbal understanding noted. Pt have met most goals and is progressing well with overall function therefore, is ready to be discharged from physical therapy services at this time. [] No change. [] Other:  [x] Patient would continue to benefit from skilled physical therapy services in order to: manage pain, improve thoracolumbar paraspinals tissue extensibility, improve flexibility of BLE, improve core and BLE strength, and promote global stability to assist pt in improving tolerance with prolonged standing and walking activities at work as a  at [employer].

## 2023-12-11 ENCOUNTER — TELEPHONE (OUTPATIENT)
Dept: FAMILY MEDICINE CLINIC | Age: 53
End: 2023-12-11

## 2023-12-11 DIAGNOSIS — Z12.31 BREAST CANCER SCREENING BY MAMMOGRAM: Primary | ICD-10-CM

## 2023-12-26 RX ORDER — FLUTICASONE PROPIONATE 50 MCG
SPRAY, SUSPENSION (ML) NASAL
Qty: 48 G | Refills: 3 | Status: SHIPPED | OUTPATIENT
Start: 2023-12-26

## 2023-12-26 NOTE — TELEPHONE ENCOUNTER
Rosa Mckee is calling to request a refill on the following medication(s):    Medication Request:  Requested Prescriptions     Pending Prescriptions Disp Refills    fluticasone (FLONASE) 50 MCG/ACT nasal spray [Pharmacy Med Name: FLUTICASONE PROP NASAL SPRAY 16GM 50MCG] 48 g 3     Sig: USE 2 SPRAYS NASALLY DAILY       Last Visit Date (If Applicable):  8/2/9767    Next Visit Date:    3/7/2024

## 2024-01-09 ENCOUNTER — HOSPITAL ENCOUNTER (OUTPATIENT)
Dept: MAMMOGRAPHY | Age: 54
Discharge: HOME | End: 2024-01-11
Payer: COMMERCIAL

## 2024-01-09 VITALS — HEIGHT: 62 IN | BODY MASS INDEX: 30.73 KG/M2 | WEIGHT: 167 LBS

## 2024-01-09 DIAGNOSIS — Z12.31 BREAST CANCER SCREENING BY MAMMOGRAM: ICD-10-CM

## 2024-01-09 PROCEDURE — 77063 BREAST TOMOSYNTHESIS BI: CPT

## 2024-01-15 ENCOUNTER — HOSPITAL ENCOUNTER (OUTPATIENT)
Dept: GENERAL RADIOLOGY | Facility: CLINIC | Age: 54
Discharge: HOME | End: 2024-01-17
Payer: COMMERCIAL

## 2024-01-15 ENCOUNTER — HOSPITAL ENCOUNTER (OUTPATIENT)
Facility: CLINIC | Age: 54
Discharge: HOME | End: 2024-01-17
Payer: COMMERCIAL

## 2024-01-15 DIAGNOSIS — N20.0 KIDNEY STONE: ICD-10-CM

## 2024-01-15 PROCEDURE — 74018 RADEX ABDOMEN 1 VIEW: CPT

## 2024-01-22 DIAGNOSIS — E03.9 HYPOTHYROIDISM, UNSPECIFIED TYPE: ICD-10-CM

## 2024-01-22 NOTE — TELEPHONE ENCOUNTER
Keri Scruggs is calling to request a refill on the following medication(s):    Medication Request:  Requested Prescriptions     Pending Prescriptions Disp Refills    levothyroxine (SYNTHROID) 100 MCG tablet [Pharmacy Med Name: L-THYROXINE (SYNTHROID) TABS 100MCG] 90 tablet 3     Sig: TAKE 1 TABLET IN THE MORNING       Last Visit Date (If Applicable):  9/6/2023    Next Visit Date:    3/7/2024

## 2024-01-23 RX ORDER — LEVOTHYROXINE SODIUM 0.1 MG/1
TABLET ORAL
Qty: 90 TABLET | Refills: 3 | Status: SHIPPED | OUTPATIENT
Start: 2024-01-23

## 2024-02-07 ENCOUNTER — APPOINTMENT (OUTPATIENT)
Dept: CT IMAGING | Age: 54
End: 2024-02-07
Payer: COMMERCIAL

## 2024-02-07 ENCOUNTER — HOSPITAL ENCOUNTER (EMERGENCY)
Age: 54
Discharge: HOME | End: 2024-02-07
Attending: EMERGENCY MEDICINE
Payer: COMMERCIAL

## 2024-02-07 VITALS
BODY MASS INDEX: 30.18 KG/M2 | DIASTOLIC BLOOD PRESSURE: 96 MMHG | RESPIRATION RATE: 14 BRPM | TEMPERATURE: 98.9 F | OXYGEN SATURATION: 95 % | WEIGHT: 165 LBS | SYSTOLIC BLOOD PRESSURE: 158 MMHG | HEART RATE: 104 BPM

## 2024-02-07 DIAGNOSIS — R11.2 NAUSEA VOMITING AND DIARRHEA: ICD-10-CM

## 2024-02-07 DIAGNOSIS — N39.0 URINARY TRACT INFECTION WITHOUT HEMATURIA, SITE UNSPECIFIED: Primary | ICD-10-CM

## 2024-02-07 DIAGNOSIS — R19.7 NAUSEA VOMITING AND DIARRHEA: ICD-10-CM

## 2024-02-07 LAB
ANION GAP SERPL CALCULATED.3IONS-SCNC: 14 MMOL/L (ref 9–17)
BACTERIA URNS QL MICRO: ABNORMAL
BASOPHILS # BLD: 0 K/UL (ref 0–0.2)
BASOPHILS NFR BLD: 0 %
BILIRUB UR QL STRIP: NEGATIVE
BUN SERPL-MCNC: 10 MG/DL (ref 6–20)
BUN/CREAT SERPL: 11 (ref 9–20)
CALCIUM SERPL-MCNC: 9.2 MG/DL (ref 8.6–10.4)
CHLORIDE SERPL-SCNC: 96 MMOL/L (ref 98–107)
CLARITY UR: ABNORMAL
CO2 SERPL-SCNC: 26 MMOL/L (ref 20–31)
COLOR UR: YELLOW
CREAT SERPL-MCNC: 0.9 MG/DL (ref 0.5–0.9)
EOSINOPHIL # BLD: 0 K/UL (ref 0–0.4)
EOSINOPHILS RELATIVE PERCENT: 0 % (ref 1–4)
EPI CELLS #/AREA URNS HPF: ABNORMAL /HPF (ref 0–5)
ERYTHROCYTE [DISTWIDTH] IN BLOOD BY AUTOMATED COUNT: 12.5 % (ref 11.8–14.4)
GFR SERPL CREATININE-BSD FRML MDRD: >60 ML/MIN/1.73M2
GLUCOSE SERPL-MCNC: 104 MG/DL (ref 70–99)
GLUCOSE UR STRIP-MCNC: NEGATIVE MG/DL
HCT VFR BLD AUTO: 43.5 % (ref 36.3–47.1)
HGB BLD-MCNC: 14.9 G/DL (ref 11.9–15.1)
HGB UR QL STRIP.AUTO: ABNORMAL
IMM GRANULOCYTES # BLD AUTO: 0 K/UL (ref 0–0.3)
IMM GRANULOCYTES NFR BLD: 0 %
KETONES UR STRIP-MCNC: NEGATIVE MG/DL
LEUKOCYTE ESTERASE UR QL STRIP: ABNORMAL
LYMPHOCYTES NFR BLD: 0.29 K/UL (ref 1–4.8)
LYMPHOCYTES RELATIVE PERCENT: 5 % (ref 24–44)
MCH RBC QN AUTO: 32.5 PG (ref 25.2–33.5)
MCHC RBC AUTO-ENTMCNC: 34.3 G/DL (ref 28.4–34.8)
MCV RBC AUTO: 94.8 FL (ref 82.6–102.9)
MONOCYTES NFR BLD: 0.64 K/UL (ref 0.2–0.8)
MONOCYTES NFR BLD: 11 % (ref 1–7)
NEUTROPHILS NFR BLD: 84 % (ref 36–66)
NEUTS SEG NFR BLD: 4.87 K/UL (ref 1.8–7.7)
NITRITE UR QL STRIP: NEGATIVE
NRBC BLD-RTO: 0 PER 100 WBC
PH UR STRIP: 7 [PH] (ref 5–8)
PLATELET # BLD AUTO: 292 K/UL (ref 138–453)
PMV BLD AUTO: 10.6 FL (ref 8.1–13.5)
POTASSIUM SERPL-SCNC: 3.3 MMOL/L (ref 3.7–5.3)
PROT UR STRIP-MCNC: ABNORMAL MG/DL
RBC # BLD AUTO: 4.59 M/UL (ref 3.95–5.11)
RBC #/AREA URNS HPF: ABNORMAL /HPF (ref 0–2)
SODIUM SERPL-SCNC: 136 MMOL/L (ref 135–144)
SP GR UR STRIP: 1.01 (ref 1–1.03)
UROBILINOGEN UR STRIP-ACNC: NORMAL EU/DL (ref 0–1)
WBC #/AREA URNS HPF: ABNORMAL /HPF (ref 0–5)
WBC OTHER # BLD: 5.8 K/UL (ref 3.5–11.3)

## 2024-02-07 PROCEDURE — 80048 BASIC METABOLIC PNL TOTAL CA: CPT

## 2024-02-07 PROCEDURE — 99284 EMERGENCY DEPT VISIT MOD MDM: CPT

## 2024-02-07 PROCEDURE — 74176 CT ABD & PELVIS W/O CONTRAST: CPT

## 2024-02-07 PROCEDURE — 87088 URINE BACTERIA CULTURE: CPT

## 2024-02-07 PROCEDURE — 87186 SC STD MICRODIL/AGAR DIL: CPT

## 2024-02-07 PROCEDURE — 6370000000 HC RX 637 (ALT 250 FOR IP): Performed by: PHYSICIAN ASSISTANT

## 2024-02-07 PROCEDURE — 81001 URINALYSIS AUTO W/SCOPE: CPT

## 2024-02-07 PROCEDURE — 85025 COMPLETE CBC W/AUTO DIFF WBC: CPT

## 2024-02-07 PROCEDURE — 87086 URINE CULTURE/COLONY COUNT: CPT

## 2024-02-07 PROCEDURE — 2500000003 HC RX 250 WO HCPCS: Performed by: PHYSICIAN ASSISTANT

## 2024-02-07 PROCEDURE — 6360000002 HC RX W HCPCS: Performed by: PHYSICIAN ASSISTANT

## 2024-02-07 PROCEDURE — 96361 HYDRATE IV INFUSION ADD-ON: CPT

## 2024-02-07 PROCEDURE — 96374 THER/PROPH/DIAG INJ IV PUSH: CPT

## 2024-02-07 PROCEDURE — 96375 TX/PRO/DX INJ NEW DRUG ADDON: CPT

## 2024-02-07 PROCEDURE — 2580000003 HC RX 258: Performed by: PHYSICIAN ASSISTANT

## 2024-02-07 RX ORDER — ONDANSETRON 4 MG/1
4 TABLET, ORALLY DISINTEGRATING ORAL EVERY 8 HOURS PRN
Qty: 21 TABLET | Refills: 0 | Status: SHIPPED | OUTPATIENT
Start: 2024-02-07 | End: 2024-02-14

## 2024-02-07 RX ORDER — ACETAMINOPHEN 500 MG
1000 TABLET ORAL ONCE
Status: COMPLETED | OUTPATIENT
Start: 2024-02-07 | End: 2024-02-07

## 2024-02-07 RX ORDER — 0.9 % SODIUM CHLORIDE 0.9 %
1000 INTRAVENOUS SOLUTION INTRAVENOUS ONCE
Status: COMPLETED | OUTPATIENT
Start: 2024-02-07 | End: 2024-02-07

## 2024-02-07 RX ORDER — CEPHALEXIN 500 MG/1
500 CAPSULE ORAL 3 TIMES DAILY
Qty: 21 CAPSULE | Refills: 0 | Status: SHIPPED | OUTPATIENT
Start: 2024-02-07 | End: 2024-02-14

## 2024-02-07 RX ORDER — ONDANSETRON 2 MG/ML
4 INJECTION INTRAMUSCULAR; INTRAVENOUS ONCE
Status: COMPLETED | OUTPATIENT
Start: 2024-02-07 | End: 2024-02-07

## 2024-02-07 RX ADMIN — WATER 1000 MG: 1 INJECTION INTRAMUSCULAR; INTRAVENOUS; SUBCUTANEOUS at 19:20

## 2024-02-07 RX ADMIN — ACETAMINOPHEN 1000 MG: 500 TABLET ORAL at 19:19

## 2024-02-07 RX ADMIN — SODIUM CHLORIDE, PRESERVATIVE FREE 20 MG: 5 INJECTION INTRAVENOUS at 17:27

## 2024-02-07 RX ADMIN — SODIUM CHLORIDE 1000 ML: 9 INJECTION, SOLUTION INTRAVENOUS at 17:27

## 2024-02-07 RX ADMIN — ONDANSETRON 4 MG: 2 INJECTION INTRAMUSCULAR; INTRAVENOUS at 17:27

## 2024-02-07 ASSESSMENT — PAIN SCALES - GENERAL
PAINLEVEL_OUTOF10: 2
PAINLEVEL_OUTOF10: 4

## 2024-02-07 NOTE — ED NOTES
Px arrives complaining of L sided flank pain and illness   Px states illness started yesterday evening with symptoms including vomiting, fever, and chills   Px states that she also has some L sided flank pain that started earlier today   Px states pain w urination and inc urinary frequency is also present.   Px has hx of kidney stones   Px appears calm and free from any immediate distress

## 2024-02-08 NOTE — ED PROVIDER NOTES
eMERGENCY dEPARTMENT eNCOUnter   Independent Attestation     Pt Name: Keri Scruggs  MRN: [id number]  Birthdate 1970  Date of evaluation: 2/7/24     Keri Scruggs is a 53 y.o. female with CC: Flank Pain (Left flank pain. Has existing kidney stone. Urinary frequency and urgency. diarrhea)      Based on the medical record the care appears appropriate.  I was personally available for consultation in the Emergency Department.    Doc Glez MD  Attending Emergency Physician                  Doc Glez MD  02/07/24 1944

## 2024-02-08 NOTE — ED PROVIDER NOTES
[facility] ED  [facility]      Pt Name: Keri Scruggs  MRN: [id number]  Birthdate 1970  Date ofevaluation: 2024  Provider: Robbi Moncada PA-C    CHIEF COMPLAINT       Chief Complaint   Patient presents with    Flank Pain     Left flank pain. Has existing kidney stone. Urinary frequency and urgency. diarrhea         HISTORY OF PRESENT ILLNESS  (Location/Symptom, Timing/Onset, Context/Setting, Quality, Duration, Modifying Factors, Severity.)   Keri Scruggs is a 53 y.o. female who presents to the emergency department with left leg pain.  Patient reports history of kidney stones.  Patient reports urinary frequency and nausea vomit diarrhea for the last 2 days.      Nursing Notes were reviewed.    ALLERGIES     Avelox [moxifloxacin hcl in nacl], Moxifloxacin, Codeine, and Sulfa antibiotics    CURRENT MEDICATIONS       Previous Medications    ALPRAZOLAM (XANAX) 0.25 MG TABLET        DICYCLOMINE (BENTYL) 20 MG TABLET    Take 1 tablet by mouth in the morning and at bedtime    FLUTICASONE (FLONASE) 50 MCG/ACT NASAL SPRAY    USE 2 SPRAYS NASALLY DAILY    LEVOTHYROXINE (SYNTHROID) 100 MCG TABLET    TAKE 1 TABLET IN THE MORNING    LISINOPRIL-HYDROCHLOROTHIAZIDE (PRINZIDE;ZESTORETIC) 10-12.5 MG PER TABLET    Take 1 tablet by mouth daily    LORATADINE (CLARITIN PO)    Take 10 mg by mouth daily     MELATONIN 3 MG TABS TABLET    Take 1 tablet by mouth nightly    SIMVASTATIN (ZOCOR) 40 MG TABLET    TAKE 1 TABLET DAILY WITH BREAKFAST    TRAZODONE (DESYREL) 150 MG TABLET    TAKE ONE-HALF (1/2) TABLET NIGHTLY       PAST MEDICAL HISTORY         Diagnosis Date    Cough     phlegm    Hyperlipidemia     Hypertension     Hypothyroid     Menorrhagia     Seasonal allergies     Sinus headache     Uterine fibroid     Wears glasses     Wellness examination     PCP Dr Jaswant Vidales       SURGICAL HISTORY           Procedure Laterality Date     SECTION      x 1    DILATION AND

## 2024-02-09 LAB
MICROORGANISM SPEC CULT: ABNORMAL
SPECIMEN DESCRIPTION: ABNORMAL

## 2024-02-15 DIAGNOSIS — K58.9 IRRITABLE BOWEL SYNDROME, UNSPECIFIED TYPE: ICD-10-CM

## 2024-02-15 RX ORDER — DICYCLOMINE HCL 20 MG
TABLET ORAL
Qty: 180 TABLET | Refills: 3 | Status: SHIPPED | OUTPATIENT
Start: 2024-02-15

## 2024-02-15 NOTE — TELEPHONE ENCOUNTER
Keri Scruggs is calling to request a refill on the following medication(s):    Medication Request:  Requested Prescriptions     Pending Prescriptions Disp Refills    dicyclomine (BENTYL) 20 MG tablet [Pharmacy Med Name: DICYCLOMINE HCL TABS 20MG] 180 tablet 3     Sig: TAKE 1 TABLET IN THE MORNING AND AT BEDTIME       Last Visit Date (If Applicable):  9/6/2023    Next Visit Date:    3/7/2024

## 2024-02-20 ENCOUNTER — TELEPHONE (OUTPATIENT)
Dept: FAMILY MEDICINE CLINIC | Age: 54
End: 2024-02-20

## 2024-02-20 DIAGNOSIS — R73.01 IFG (IMPAIRED FASTING GLUCOSE): ICD-10-CM

## 2024-02-20 DIAGNOSIS — E03.9 HYPOTHYROIDISM, UNSPECIFIED TYPE: Primary | ICD-10-CM

## 2024-02-20 DIAGNOSIS — E78.2 MIXED HYPERLIPIDEMIA: Chronic | ICD-10-CM

## 2024-02-20 NOTE — TELEPHONE ENCOUNTER
Patient has appointment 03/07/24 are there labs you want done prior to her appointment?    Please advise

## 2024-02-21 DIAGNOSIS — I10 PRIMARY HYPERTENSION: Chronic | ICD-10-CM

## 2024-02-21 NOTE — TELEPHONE ENCOUNTER
Keri Scruggs is calling to request a refill on the following medication(s):    Last Visit Date (If Applicable):  9/6/2023    Next Visit Date:    3/7/2024    Medication Request:  Requested Prescriptions     Pending Prescriptions Disp Refills    lisinopril-hydroCHLOROthiazide (PRINZIDE;ZESTORETIC) 10-12.5 MG per tablet [Pharmacy Med Name: LISINOPRIL/HCTZ TABS 10/12.5MG] 90 tablet 3     Sig: TAKE 1 TABLET DAILY

## 2024-02-23 RX ORDER — LISINOPRIL AND HYDROCHLOROTHIAZIDE 12.5; 1 MG/1; MG/1
1 TABLET ORAL DAILY
Qty: 90 TABLET | Refills: 3 | Status: SHIPPED | OUTPATIENT
Start: 2024-02-23

## 2024-03-02 ENCOUNTER — HOSPITAL ENCOUNTER (OUTPATIENT)
Age: 54
Discharge: HOME | End: 2024-03-02
Payer: COMMERCIAL

## 2024-03-02 DIAGNOSIS — E03.9 HYPOTHYROIDISM, UNSPECIFIED TYPE: ICD-10-CM

## 2024-03-02 DIAGNOSIS — R73.01 IFG (IMPAIRED FASTING GLUCOSE): ICD-10-CM

## 2024-03-02 DIAGNOSIS — E78.2 MIXED HYPERLIPIDEMIA: Chronic | ICD-10-CM

## 2024-03-02 LAB
ALBUMIN SERPL-MCNC: 4.6 G/DL (ref 3.5–5.2)
ALBUMIN/GLOB SERPL: 1.6 {RATIO} (ref 1–2.5)
ALP SERPL-CCNC: 55 U/L (ref 35–104)
ALT SERPL-CCNC: 21 U/L (ref 5–33)
ANION GAP SERPL CALCULATED.3IONS-SCNC: 12 MMOL/L (ref 9–17)
AST SERPL-CCNC: 21 U/L
BILIRUB SERPL-MCNC: 0.6 MG/DL (ref 0.3–1.2)
BUN SERPL-MCNC: 11 MG/DL (ref 6–20)
CALCIUM SERPL-MCNC: 9.8 MG/DL (ref 8.6–10.4)
CHLORIDE SERPL-SCNC: 103 MMOL/L (ref 98–107)
CHOLEST SERPL-MCNC: 189 MG/DL
CHOLESTEROL/HDL RATIO: 3.2
CO2 SERPL-SCNC: 27 MMOL/L (ref 20–31)
CREAT SERPL-MCNC: 0.9 MG/DL (ref 0.5–0.9)
EST. AVERAGE GLUCOSE BLD GHB EST-MCNC: 82 MG/DL
GFR SERPL CREATININE-BSD FRML MDRD: >60 ML/MIN/1.73M2
GLUCOSE SERPL-MCNC: 91 MG/DL (ref 70–99)
HBA1C MFR BLD: 4.5 % (ref 4–6)
HDLC SERPL-MCNC: 60 MG/DL
LDLC SERPL CALC-MCNC: 105 MG/DL (ref 0–130)
POTASSIUM SERPL-SCNC: 4.1 MMOL/L (ref 3.7–5.3)
PROT SERPL-MCNC: 7.4 G/DL (ref 6.4–8.3)
SODIUM SERPL-SCNC: 142 MMOL/L (ref 135–144)
TRIGL SERPL-MCNC: 122 MG/DL
TSH SERPL DL<=0.05 MIU/L-ACNC: 0.74 UIU/ML (ref 0.3–5)

## 2024-03-02 PROCEDURE — 80061 LIPID PANEL: CPT

## 2024-03-02 PROCEDURE — 36415 COLL VENOUS BLD VENIPUNCTURE: CPT

## 2024-03-02 PROCEDURE — 83036 HEMOGLOBIN GLYCOSYLATED A1C: CPT

## 2024-03-02 PROCEDURE — 84443 ASSAY THYROID STIM HORMONE: CPT

## 2024-03-02 PROCEDURE — 80053 COMPREHEN METABOLIC PANEL: CPT

## 2024-03-07 ENCOUNTER — OFFICE VISIT (OUTPATIENT)
Dept: FAMILY MEDICINE CLINIC | Age: 54
End: 2024-03-07
Payer: COMMERCIAL

## 2024-03-07 VITALS
WEIGHT: 166.6 LBS | OXYGEN SATURATION: 96 % | HEART RATE: 95 BPM | SYSTOLIC BLOOD PRESSURE: 124 MMHG | DIASTOLIC BLOOD PRESSURE: 82 MMHG | BODY MASS INDEX: 30.47 KG/M2

## 2024-03-07 DIAGNOSIS — E78.2 MIXED HYPERLIPIDEMIA: Chronic | ICD-10-CM

## 2024-03-07 DIAGNOSIS — I10 PRIMARY HYPERTENSION: Primary | Chronic | ICD-10-CM

## 2024-03-07 DIAGNOSIS — F41.9 ANXIETY: ICD-10-CM

## 2024-03-07 DIAGNOSIS — K58.0 IRRITABLE BOWEL SYNDROME WITH DIARRHEA: ICD-10-CM

## 2024-03-07 DIAGNOSIS — E03.9 HYPOTHYROIDISM, UNSPECIFIED TYPE: ICD-10-CM

## 2024-03-07 DIAGNOSIS — G47.9 SLEEP TROUBLE: ICD-10-CM

## 2024-03-07 DIAGNOSIS — Z87.898 H/O MOTION SICKNESS: ICD-10-CM

## 2024-03-07 DIAGNOSIS — R73.01 IFG (IMPAIRED FASTING GLUCOSE): ICD-10-CM

## 2024-03-07 PROCEDURE — G8417 CALC BMI ABV UP PARAM F/U: HCPCS | Performed by: FAMILY MEDICINE

## 2024-03-07 PROCEDURE — 3017F COLORECTAL CA SCREEN DOC REV: CPT | Performed by: FAMILY MEDICINE

## 2024-03-07 PROCEDURE — G8427 DOCREV CUR MEDS BY ELIG CLIN: HCPCS | Performed by: FAMILY MEDICINE

## 2024-03-07 PROCEDURE — G8484 FLU IMMUNIZE NO ADMIN: HCPCS | Performed by: FAMILY MEDICINE

## 2024-03-07 PROCEDURE — 1036F TOBACCO NON-USER: CPT | Performed by: FAMILY MEDICINE

## 2024-03-07 PROCEDURE — 3079F DIAST BP 80-89 MM HG: CPT | Performed by: FAMILY MEDICINE

## 2024-03-07 PROCEDURE — 3074F SYST BP LT 130 MM HG: CPT | Performed by: FAMILY MEDICINE

## 2024-03-07 PROCEDURE — 99214 OFFICE O/P EST MOD 30 MIN: CPT | Performed by: FAMILY MEDICINE

## 2024-03-07 RX ORDER — ALPRAZOLAM 0.25 MG/1
0.25 TABLET ORAL DAILY PRN
Qty: 15 TABLET | Refills: 0 | Status: SHIPPED | OUTPATIENT
Start: 2024-03-07 | End: 2024-04-06

## 2024-03-07 RX ORDER — SCOLOPAMINE TRANSDERMAL SYSTEM 1 MG/1
1 PATCH, EXTENDED RELEASE TRANSDERMAL
Qty: 15 PATCH | Refills: 0 | Status: SHIPPED | OUTPATIENT
Start: 2024-03-07

## 2024-03-07 ASSESSMENT — PATIENT HEALTH QUESTIONNAIRE - PHQ9
1. LITTLE INTEREST OR PLEASURE IN DOING THINGS: 0
SUM OF ALL RESPONSES TO PHQ QUESTIONS 1-9: 0
2. FEELING DOWN, DEPRESSED OR HOPELESS: 0
SUM OF ALL RESPONSES TO PHQ QUESTIONS 1-9: 0
3. TROUBLE FALLING OR STAYING ASLEEP: 0
8. MOVING OR SPEAKING SO SLOWLY THAT OTHER PEOPLE COULD HAVE NOTICED. OR THE OPPOSITE, BEING SO FIGETY OR RESTLESS THAT YOU HAVE BEEN MOVING AROUND A LOT MORE THAN USUAL: 0
9. THOUGHTS THAT YOU WOULD BE BETTER OFF DEAD, OR OF HURTING YOURSELF: 0
SUM OF ALL RESPONSES TO PHQ QUESTIONS 1-9: 0
SUM OF ALL RESPONSES TO PHQ9 QUESTIONS 1 & 2: 0
7. TROUBLE CONCENTRATING ON THINGS, SUCH AS READING THE NEWSPAPER OR WATCHING TELEVISION: 0
4. FEELING TIRED OR HAVING LITTLE ENERGY: 0
SUM OF ALL RESPONSES TO PHQ QUESTIONS 1-9: 0
5. POOR APPETITE OR OVEREATING: 0
10. IF YOU CHECKED OFF ANY PROBLEMS, HOW DIFFICULT HAVE THESE PROBLEMS MADE IT FOR YOU TO DO YOUR WORK, TAKE CARE OF THINGS AT HOME, OR GET ALONG WITH OTHER PEOPLE: 0
6. FEELING BAD ABOUT YOURSELF - OR THAT YOU ARE A FAILURE OR HAVE LET YOURSELF OR YOUR FAMILY DOWN: 0

## 2024-03-07 ASSESSMENT — ENCOUNTER SYMPTOMS
GASTROINTESTINAL NEGATIVE: 1
SHORTNESS OF BREATH: 0
RESPIRATORY NEGATIVE: 1
ORTHOPNEA: 0
BLURRED VISION: 0
EYES NEGATIVE: 1
ALLERGIC/IMMUNOLOGIC NEGATIVE: 1

## 2024-03-07 NOTE — PROGRESS NOTES
not ill-appearing, toxic-appearing or diaphoretic.   HENT:      Head: Normocephalic and atraumatic.   Eyes:      General: No scleral icterus.        Right eye: No discharge.         Left eye: No discharge.      Extraocular Movements: Extraocular movements intact.      Conjunctiva/sclera: Conjunctivae normal.   Cardiovascular:      Rate and Rhythm: Normal rate and regular rhythm.      Pulses: Normal pulses.      Heart sounds: Normal heart sounds. No murmur heard.     No friction rub. No gallop.   Pulmonary:      Effort: Pulmonary effort is normal. No respiratory distress.      Breath sounds: Normal breath sounds. No stridor. No wheezing, rhonchi or rales.   Chest:      Chest wall: No tenderness.   Neurological:      Mental Status: She is alert and oriented to person, place, and time. Mental status is at baseline.   Psychiatric:         Mood and Affect: Mood normal.         Behavior: Behavior normal.         Thought Content: Thought content normal.         Judgment: Judgment normal.         Labs/Imaging/Diagnostics   Labs:  Hemoglobin A1C   Date Value Ref Range Status   03/02/2024 4.5 4.0 - 6.0 % Final       Imaging Last 24 Hours:  CT ABDOMEN PELVIS WO CONTRAST Additional Contrast? None  Narrative: EXAMINATION:  CT OF THE ABDOMEN AND PELVIS WITHOUT CONTRAST 2/7/2024 3:16 pm    TECHNIQUE:  CT of the abdomen and pelvis was performed without the administration of  intravenous contrast. Multiplanar reformatted images are provided for review.  Automated exposure control, iterative reconstruction, and/or weight based  adjustment of the mA/kV was utilized to reduce the radiation dose to as low  as reasonably achievable.    COMPARISON:  None    HISTORY:  ORDERING SYSTEM PROVIDED HISTORY: left flank pain  TECHNOLOGIST PROVIDED HISTORY:  left flank pain    Decision Support Exception - unselect if not a suspected or confirmed  emergency medical condition->Emergency Medical Condition (MA)  Is the patient pregnant?->No  Reason for

## 2024-05-16 ENCOUNTER — OFFICE VISIT (OUTPATIENT)
Dept: FAMILY MEDICINE CLINIC | Age: 54
End: 2024-05-16
Payer: COMMERCIAL

## 2024-05-16 ENCOUNTER — HOSPITAL ENCOUNTER (OUTPATIENT)
Age: 54
Setting detail: SPECIMEN
Discharge: HOME | End: 2024-05-16

## 2024-05-16 VITALS
DIASTOLIC BLOOD PRESSURE: 88 MMHG | OXYGEN SATURATION: 99 % | BODY MASS INDEX: 30.73 KG/M2 | SYSTOLIC BLOOD PRESSURE: 128 MMHG | WEIGHT: 168 LBS | TEMPERATURE: 97.6 F | HEART RATE: 80 BPM

## 2024-05-16 DIAGNOSIS — R30.0 DYSURIA: Primary | ICD-10-CM

## 2024-05-16 DIAGNOSIS — K58.0 IRRITABLE BOWEL SYNDROME WITH DIARRHEA: ICD-10-CM

## 2024-05-16 DIAGNOSIS — R30.0 DYSURIA: ICD-10-CM

## 2024-05-16 DIAGNOSIS — R14.0 ABDOMINAL BLOATING: ICD-10-CM

## 2024-05-16 LAB
BILIRUBIN, POC: ABNORMAL
BLOOD URINE, POC: ABNORMAL
CLARITY, POC: CLEAR
COLOR, POC: YELLOW
GLUCOSE URINE, POC: ABNORMAL
KETONES, POC: ABNORMAL
LEUKOCYTE EST, POC: ABNORMAL
NITRITE, POC: ABNORMAL
PH, POC: 7.5
PROTEIN, POC: ABNORMAL
SPECIFIC GRAVITY, POC: 1.01
UROBILINOGEN, POC: 0.2

## 2024-05-16 PROCEDURE — 81002 URINALYSIS NONAUTO W/O SCOPE: CPT

## 2024-05-16 PROCEDURE — 3079F DIAST BP 80-89 MM HG: CPT

## 2024-05-16 PROCEDURE — G8427 DOCREV CUR MEDS BY ELIG CLIN: HCPCS

## 2024-05-16 PROCEDURE — 1036F TOBACCO NON-USER: CPT

## 2024-05-16 PROCEDURE — 3074F SYST BP LT 130 MM HG: CPT

## 2024-05-16 PROCEDURE — 3017F COLORECTAL CA SCREEN DOC REV: CPT

## 2024-05-16 PROCEDURE — 99214 OFFICE O/P EST MOD 30 MIN: CPT

## 2024-05-16 PROCEDURE — G8417 CALC BMI ABV UP PARAM F/U: HCPCS

## 2024-05-16 NOTE — PROGRESS NOTES
Keri Scruggs (:  1970) is a 54 y.o. female,Established patient, here for evaluation of the following chief complaint(s):  Urinary Tract Infection (Frequent urination, pain left side and back. Has small kidney stone sxs 1 day) and Constipation (Feel bloated sxs 1 week)          Subjective   SUBJECTIVE/OBJECTIVE:  Pt here today for possible UTI  VSS    Pt reports over the weekend she traveled and began having some IBS sx  She feels some abdominal bloating, some loose stools   Denies fevers, chills    She began having UTI sx last night which drove her to schedule her appt  Reports urgency, frequency  No further burning  Has not taken anything for these sx  Known kidney stones to bilateral kidneys on CT in February            Review of Systems       Objective   Physical Exam  Vitals and nursing note reviewed.   Constitutional:       General: She is not in acute distress.     Appearance: Normal appearance. She is not ill-appearing, toxic-appearing or diaphoretic.   Abdominal:      General: Abdomen is flat. Bowel sounds are normal. There is no distension.      Palpations: Abdomen is soft.      Tenderness: There is no abdominal tenderness. There is no right CVA tenderness, left CVA tenderness or guarding.   Neurological:      Mental Status: She is alert.              Assessment & Plan   ASSESSMENT/PLAN:  1. Dysuria  2. Abdominal bloating  3. Irritable bowel syndrome with diarrhea  -UA w/ trace blood/leuks  -will send culture  -low suspicion of stone d/t no CVA tenderness and overall non-acute sx    -     POCT Urinalysis Dipstick no Micro  -     Culture, Urine; Future        Return if symptoms worsen or fail to improve.               An electronic signature was used to authenticate this note.    --Samreen Oliver, CASSANDRA - CNP

## 2024-05-17 DIAGNOSIS — N30.01 ACUTE CYSTITIS WITH HEMATURIA: Primary | ICD-10-CM

## 2024-05-17 RX ORDER — NITROFURANTOIN 25; 75 MG/1; MG/1
100 CAPSULE ORAL 2 TIMES DAILY
Qty: 14 CAPSULE | Refills: 0 | Status: SHIPPED | OUTPATIENT
Start: 2024-05-17 | End: 2024-05-24

## 2024-05-18 LAB
MICROORGANISM SPEC CULT: ABNORMAL
SPECIMEN DESCRIPTION: ABNORMAL

## 2024-05-20 DIAGNOSIS — N39.0 COMPLICATED UTI (URINARY TRACT INFECTION): Primary | ICD-10-CM

## 2024-05-20 RX ORDER — LEVOFLOXACIN 500 MG/1
500 TABLET, FILM COATED ORAL DAILY
Qty: 7 TABLET | Refills: 0 | Status: SHIPPED | OUTPATIENT
Start: 2024-05-20 | End: 2024-05-27

## 2024-05-20 RX ORDER — ONDANSETRON 4 MG/1
4 TABLET, ORALLY DISINTEGRATING ORAL 3 TIMES DAILY PRN
Qty: 21 TABLET | Refills: 0 | Status: SHIPPED | OUTPATIENT
Start: 2024-05-20

## 2024-05-25 ENCOUNTER — HOSPITAL ENCOUNTER (OUTPATIENT)
Age: 54
Discharge: HOME | End: 2024-05-25
Payer: COMMERCIAL

## 2024-05-25 DIAGNOSIS — N39.0 COMPLICATED UTI (URINARY TRACT INFECTION): ICD-10-CM

## 2024-05-25 LAB
BILIRUB UR QL STRIP: NEGATIVE
CLARITY UR: CLEAR
COLOR UR: YELLOW
COMMENT: ABNORMAL
GLUCOSE UR STRIP-MCNC: NEGATIVE MG/DL
HGB UR QL STRIP.AUTO: NEGATIVE
KETONES UR STRIP-MCNC: NEGATIVE MG/DL
LEUKOCYTE ESTERASE UR QL STRIP: NEGATIVE
NITRITE UR QL STRIP: NEGATIVE
PH UR STRIP: 7 [PH] (ref 5–8)
PROT UR STRIP-MCNC: NEGATIVE MG/DL
SP GR UR STRIP: 1 (ref 1–1.03)
UROBILINOGEN UR STRIP-ACNC: NORMAL EU/DL (ref 0–1)

## 2024-05-25 PROCEDURE — 87086 URINE CULTURE/COLONY COUNT: CPT

## 2024-05-25 PROCEDURE — 81003 URINALYSIS AUTO W/O SCOPE: CPT

## 2024-05-26 LAB
MICROORGANISM SPEC CULT: NORMAL
SPECIMEN DESCRIPTION: NORMAL

## 2024-06-07 DIAGNOSIS — E78.2 MIXED HYPERLIPIDEMIA: Chronic | ICD-10-CM

## 2024-06-07 RX ORDER — SIMVASTATIN 40 MG
TABLET ORAL
Qty: 90 TABLET | Refills: 3 | Status: SHIPPED | OUTPATIENT
Start: 2024-06-07

## 2024-06-07 NOTE — TELEPHONE ENCOUNTER
Keri Scruggs is calling to request a refill on the following medication(s):    Medication Request:  Requested Prescriptions     Pending Prescriptions Disp Refills    simvastatin (ZOCOR) 40 MG tablet [Pharmacy Med Name: SIMVASTATIN TABS 40MG] 90 tablet 3     Sig: TAKE 1 TABLET DAILY WITH BREAKFAST       Last Visit Date (If Applicable):  3/7/2024    Next Visit Date:    9/13/2024

## 2024-07-05 ENCOUNTER — OFFICE VISIT (OUTPATIENT)
Dept: FAMILY MEDICINE CLINIC | Age: 54
End: 2024-07-05
Payer: COMMERCIAL

## 2024-07-05 VITALS
OXYGEN SATURATION: 99 % | BODY MASS INDEX: 31.93 KG/M2 | DIASTOLIC BLOOD PRESSURE: 112 MMHG | WEIGHT: 174.6 LBS | SYSTOLIC BLOOD PRESSURE: 148 MMHG | TEMPERATURE: 97 F | HEART RATE: 92 BPM

## 2024-07-05 DIAGNOSIS — R51.9 ACHING HEADACHE: ICD-10-CM

## 2024-07-05 DIAGNOSIS — I10 PRIMARY HYPERTENSION: Primary | Chronic | ICD-10-CM

## 2024-07-05 PROCEDURE — 1036F TOBACCO NON-USER: CPT | Performed by: NURSE PRACTITIONER

## 2024-07-05 PROCEDURE — G8427 DOCREV CUR MEDS BY ELIG CLIN: HCPCS | Performed by: NURSE PRACTITIONER

## 2024-07-05 PROCEDURE — 3017F COLORECTAL CA SCREEN DOC REV: CPT | Performed by: NURSE PRACTITIONER

## 2024-07-05 PROCEDURE — 3080F DIAST BP >= 90 MM HG: CPT | Performed by: NURSE PRACTITIONER

## 2024-07-05 PROCEDURE — 99214 OFFICE O/P EST MOD 30 MIN: CPT | Performed by: NURSE PRACTITIONER

## 2024-07-05 PROCEDURE — 3077F SYST BP >= 140 MM HG: CPT | Performed by: NURSE PRACTITIONER

## 2024-07-05 PROCEDURE — G8417 CALC BMI ABV UP PARAM F/U: HCPCS | Performed by: NURSE PRACTITIONER

## 2024-07-05 RX ORDER — LISINOPRIL AND HYDROCHLOROTHIAZIDE 20; 12.5 MG/1; MG/1
1 TABLET ORAL DAILY
Qty: 90 TABLET | Refills: 1 | Status: SHIPPED | OUTPATIENT
Start: 2024-07-05

## 2024-07-05 SDOH — ECONOMIC STABILITY: INCOME INSECURITY: HOW HARD IS IT FOR YOU TO PAY FOR THE VERY BASICS LIKE FOOD, HOUSING, MEDICAL CARE, AND HEATING?: NOT HARD AT ALL

## 2024-07-05 SDOH — ECONOMIC STABILITY: FOOD INSECURITY: WITHIN THE PAST 12 MONTHS, THE FOOD YOU BOUGHT JUST DIDN'T LAST AND YOU DIDN'T HAVE MONEY TO GET MORE.: NEVER TRUE

## 2024-07-05 SDOH — ECONOMIC STABILITY: FOOD INSECURITY: WITHIN THE PAST 12 MONTHS, YOU WORRIED THAT YOUR FOOD WOULD RUN OUT BEFORE YOU GOT MONEY TO BUY MORE.: NEVER TRUE

## 2024-07-05 ASSESSMENT — ENCOUNTER SYMPTOMS: SINUS PRESSURE: 1

## 2024-07-05 NOTE — PROGRESS NOTES
[facility]  [address]  [place]  [address]  [phone]  [phone]    Keri Scruggs is a 54 y.o. female who is here with c/o of:    Chief Complaint: Headache (Pt also has elevated B.P)      Patient Accompanied by: n/a    HPI - Keri Scruggs is here today with c/o:    Has been elevated since last Tuesday. She was taking a lot of sinus medication while in [place] due to having sinus pressure. She started checking bp because she was experiencing headaches and lightheadedness. Reports she has been averaging 150-160/90's. She has since stopped the sinus medication on Tuesday but still having elevated blood pressures.       There are no preventive care reminders to display for this patient.     Patient Active Problem List:     Hypertension     Hyperlipemia     Anxiety     Depression     Hypothyroidism     Sleep trouble     Irritable bowel syndrome     Nephrolithiasis     Past Medical History:   Diagnosis Date    Cough     phlegm    Hyperlipidemia     Hypertension     Hypothyroid     Menorrhagia     Seasonal allergies     Sinus headache     Uterine fibroid     Wears glasses     Wellness examination     PCP Dr Jaswant Vidales      Past Surgical History:   Procedure Laterality Date     SECTION      x 1    DILATION AND CURETTAGE OF UTERUS N/A 2019    DILATATION AND CURETTAGE HYSTEROSCOPY performed by Ivan Dejesus MD at [facility] OR    EXTRACORPOREAL SHOCK WAVE LITHOTRIPSY  2023    ESWL EXTRACORPOREAL SHOCK WAVE LITHOTRIPSY    HYSTERECTOMY, VAGINAL N/A 2020    HYSTERECTOMY VAGINAL LAPAROSCOPIC ASSISTED (LAVH) WITH CYSTO AND LYSIS OF ADHESIONS performed by Ivan Dejesus MD at [facility] OR    LITHOTRIPSY Right 2023    ESWL EXTRACORPOREAL SHOCK WAVE LITHOTRIPSY (NEX-MED CONF# [id number]) performed by Tadeo Louise MD at [facility] OR    US BREAST BIOPSY W LOC DEVICE 1ST LESION LEFT Left 2021    US BREAST NEEDLE BIOPSY LEFT

## 2024-07-17 ENCOUNTER — HOSPITAL ENCOUNTER (OUTPATIENT)
Age: 54
Setting detail: SPECIMEN
Discharge: HOME | End: 2024-07-17

## 2024-07-17 DIAGNOSIS — I10 PRIMARY HYPERTENSION: Chronic | ICD-10-CM

## 2024-07-17 DIAGNOSIS — R51.9 ACHING HEADACHE: ICD-10-CM

## 2024-07-17 LAB
ANION GAP SERPL CALCULATED.3IONS-SCNC: 11 MMOL/L (ref 9–16)
BUN SERPL-MCNC: 13 MG/DL (ref 6–20)
CALCIUM SERPL-MCNC: 10.9 MG/DL (ref 8.6–10.4)
CHLORIDE SERPL-SCNC: 102 MMOL/L (ref 98–107)
CO2 SERPL-SCNC: 28 MMOL/L (ref 20–31)
CREAT SERPL-MCNC: 1 MG/DL (ref 0.5–0.9)
GFR, ESTIMATED: 67 ML/MIN/1.73M2
GLUCOSE SERPL-MCNC: 82 MG/DL (ref 74–99)
POTASSIUM SERPL-SCNC: 4.1 MMOL/L (ref 3.7–5.3)
SODIUM SERPL-SCNC: 141 MMOL/L (ref 136–145)

## 2024-07-19 ENCOUNTER — OFFICE VISIT (OUTPATIENT)
Dept: FAMILY MEDICINE CLINIC | Age: 54
End: 2024-07-19
Payer: COMMERCIAL

## 2024-07-19 VITALS — WEIGHT: 169 LBS | BODY MASS INDEX: 30.91 KG/M2 | SYSTOLIC BLOOD PRESSURE: 152 MMHG | DIASTOLIC BLOOD PRESSURE: 82 MMHG

## 2024-07-19 DIAGNOSIS — E78.2 MIXED HYPERLIPIDEMIA: Chronic | ICD-10-CM

## 2024-07-19 DIAGNOSIS — G47.9 SLEEP TROUBLE: ICD-10-CM

## 2024-07-19 DIAGNOSIS — I10 PRIMARY HYPERTENSION: Primary | Chronic | ICD-10-CM

## 2024-07-19 DIAGNOSIS — E03.9 HYPOTHYROIDISM, UNSPECIFIED TYPE: ICD-10-CM

## 2024-07-19 DIAGNOSIS — E83.52 HYPERCALCEMIA: ICD-10-CM

## 2024-07-19 DIAGNOSIS — B96.89 ACUTE BACTERIAL SINUSITIS: ICD-10-CM

## 2024-07-19 DIAGNOSIS — F32.A DEPRESSION, UNSPECIFIED DEPRESSION TYPE: ICD-10-CM

## 2024-07-19 DIAGNOSIS — F41.9 ANXIETY: ICD-10-CM

## 2024-07-19 DIAGNOSIS — K58.0 IRRITABLE BOWEL SYNDROME WITH DIARRHEA: ICD-10-CM

## 2024-07-19 DIAGNOSIS — J01.90 ACUTE BACTERIAL SINUSITIS: ICD-10-CM

## 2024-07-19 PROCEDURE — G8417 CALC BMI ABV UP PARAM F/U: HCPCS | Performed by: FAMILY MEDICINE

## 2024-07-19 PROCEDURE — 3077F SYST BP >= 140 MM HG: CPT | Performed by: FAMILY MEDICINE

## 2024-07-19 PROCEDURE — 1036F TOBACCO NON-USER: CPT | Performed by: FAMILY MEDICINE

## 2024-07-19 PROCEDURE — 99214 OFFICE O/P EST MOD 30 MIN: CPT | Performed by: FAMILY MEDICINE

## 2024-07-19 PROCEDURE — 3079F DIAST BP 80-89 MM HG: CPT | Performed by: FAMILY MEDICINE

## 2024-07-19 PROCEDURE — G8427 DOCREV CUR MEDS BY ELIG CLIN: HCPCS | Performed by: FAMILY MEDICINE

## 2024-07-19 PROCEDURE — 3017F COLORECTAL CA SCREEN DOC REV: CPT | Performed by: FAMILY MEDICINE

## 2024-07-19 RX ORDER — AZELASTINE 1 MG/ML
2 SPRAY, METERED NASAL 2 TIMES DAILY
Qty: 120 ML | Refills: 1 | Status: SHIPPED | OUTPATIENT
Start: 2024-07-19

## 2024-07-19 RX ORDER — AMOXICILLIN AND CLAVULANATE POTASSIUM 875; 125 MG/1; MG/1
1 TABLET, FILM COATED ORAL 2 TIMES DAILY
Qty: 14 TABLET | Refills: 0 | Status: SHIPPED | OUTPATIENT
Start: 2024-07-19 | End: 2024-07-26

## 2024-07-19 RX ORDER — LEVOCETIRIZINE DIHYDROCHLORIDE 5 MG/1
5 TABLET, FILM COATED ORAL NIGHTLY
Qty: 30 TABLET | Refills: 0 | Status: SHIPPED | OUTPATIENT
Start: 2024-07-19

## 2024-07-19 RX ORDER — PREDNISONE 20 MG/1
20 TABLET ORAL 2 TIMES DAILY
Qty: 10 TABLET | Refills: 0 | Status: SHIPPED | OUTPATIENT
Start: 2024-07-19 | End: 2024-07-24

## 2024-07-19 RX ORDER — FLUTICASONE PROPIONATE 50 MCG
2 SPRAY, SUSPENSION (ML) NASAL DAILY
Qty: 16 G | Refills: 0 | Status: SHIPPED | OUTPATIENT
Start: 2024-07-19

## 2024-07-19 ASSESSMENT — ENCOUNTER SYMPTOMS
BLURRED VISION: 0
ALLERGIC/IMMUNOLOGIC NEGATIVE: 1
EYES NEGATIVE: 1
ORTHOPNEA: 0
GASTROINTESTINAL NEGATIVE: 1
SHORTNESS OF BREATH: 0
RESPIRATORY NEGATIVE: 1

## 2024-07-19 NOTE — PROGRESS NOTES
[facility] Progress Note    Date:7/19/2024         Patient Name:Keri Scruggs     YOB: 1970     Age:54 y.o.    Assessment/Plan        Problem List Items Addressed This Visit          Circulatory    Hypertension - Primary (Chronic)      Borderline controlled, continue current medications, continue current treatment plan, medication adherence emphasized, lifestyle modifications recommended, and controlled at home on home BP log            Digestive    Irritable bowel syndrome      At goal, continue current medications and continue current treatment plan            Endocrine    Hypothyroidism      At goal, continue current medications and continue current treatment plan            Other    Hyperlipemia (Chronic)      At goal, continue current medications and continue current treatment plan         Anxiety      At goal, continue current medications and continue current treatment plan         Depression      At goal, continue current medications and continue current treatment plan         Sleep trouble      At goal, continue current medications and continue current treatment plan          Other Visit Diagnoses       Acute bacterial sinusitis        Relevant Medications    amoxicillin-clavulanate (AUGMENTIN) 875-125 MG per tablet    predniSONE (DELTASONE) 20 MG tablet    fluticasone (FLONASE) 50 MCG/ACT nasal spray    azelastine (ASTELIN) 0.1 % nasal spray    levocetirizine (XYZAL) 5 MG tablet    Hypercalcemia        Relevant Orders    PTH, Intact             Return in about 6 months (around 1/19/2025).    Electronically signed by Jaswant Vidales DO on 7/19/24       Total time spent was between Time personally spent assessing and managing the patient on the date of service: Est: 30-39 minutes (20167) mins. This included time spent reviewing the patient's medical record (e.g., recent visits, labs, and studies); seeing the patient in the office (face-to-face time); ordering medications, studies, procedures,

## 2024-07-19 NOTE — ASSESSMENT & PLAN NOTE
Borderline controlled, continue current medications, continue current treatment plan, medication adherence emphasized, lifestyle modifications recommended, and controlled at home on home BP log

## 2024-07-22 NOTE — PROGRESS NOTES
[facility], [facility]  [facility] [place]  [address] [place] [address]  [address]  [facility] [address]  Dept: [phone]  Dept Fax: [phone]         2024  Keri Scruggs       Chief Complaint  Chief Complaint   Patient presents with    hospitals Care     Last pap 18 WNL (HYST) last mammogram 1/10/24 WNL     Skin Problem     Noticed a bump on labia - denies pain or discharge     .    Blood pressure 135/87, height 1.575 m (5' 2\"), weight 78.2 kg (172 lb 6.4 oz), last menstrual period 2019.    HPI:     Keri Scruggs  1970 is a 54 y.o.  here today for evaluation of a labial lump.  She is a new patient who was last seen by Dr. Dejesus for a postop appointment after hysterectomy ([facility]) due to fibroids and dysfunctional bleeding.  She has a painless bump on her left labia that she would like evaluated.  It has been there a while      OB History    Para Term  AB Living   1 1 1 0 0 2   SAB IAB Ectopic Molar Multiple Live Births   0 0 0 0 1 2      # Outcome Date GA Lbr Bang/2nd Weight Sex Delivery Anes PTL Lv   1A Term 94    F CS-LTranv   MAURICE   1B Term 94    F CS-LTranv   MAURICE       Past Medical History:   Diagnosis Date    Cough     phlegm    Hyperlipidemia     Hypertension     Hypothyroid     Menorrhagia     Seasonal allergies     Sinus headache     Uterine fibroid     Wears glasses     Wellness examination     PCP Dr Jaswant Vidales       Past Surgical History:   Procedure Laterality Date     SECTION  1994    DILATION AND CURETTAGE OF UTERUS N/A 2019    DILATATION AND CURETTAGE HYSTEROSCOPY performed by Ivan Dejesus MD at [facility] OR    EXTRACORPOREAL SHOCK WAVE LITHOTRIPSY  2023    ESWL EXTRACORPOREAL SHOCK WAVE LITHOTRIPSY    HYSTERECTOMY, VAGINAL N/A 2020    HYSTERECTOMY VAGINAL LAPAROSCOPIC ASSISTED (LAVH) WITH CYSTO AND LYSIS OF ADHESIONS performed by Ivan Dejesus MD at

## 2024-07-23 ENCOUNTER — OFFICE VISIT (OUTPATIENT)
Dept: OBGYN CLINIC | Age: 54
End: 2024-07-23
Payer: COMMERCIAL

## 2024-07-23 VITALS
WEIGHT: 172.4 LBS | SYSTOLIC BLOOD PRESSURE: 135 MMHG | BODY MASS INDEX: 31.73 KG/M2 | DIASTOLIC BLOOD PRESSURE: 87 MMHG | HEIGHT: 62 IN

## 2024-07-23 DIAGNOSIS — N90.7 INCLUSION CYST OF VULVA: Primary | ICD-10-CM

## 2024-07-23 PROCEDURE — 1036F TOBACCO NON-USER: CPT | Performed by: NURSE PRACTITIONER

## 2024-07-23 PROCEDURE — 99203 OFFICE O/P NEW LOW 30 MIN: CPT | Performed by: NURSE PRACTITIONER

## 2024-07-23 PROCEDURE — 3079F DIAST BP 80-89 MM HG: CPT | Performed by: NURSE PRACTITIONER

## 2024-07-23 PROCEDURE — G8417 CALC BMI ABV UP PARAM F/U: HCPCS | Performed by: NURSE PRACTITIONER

## 2024-07-23 PROCEDURE — 3017F COLORECTAL CA SCREEN DOC REV: CPT | Performed by: NURSE PRACTITIONER

## 2024-07-23 PROCEDURE — G8427 DOCREV CUR MEDS BY ELIG CLIN: HCPCS | Performed by: NURSE PRACTITIONER

## 2024-07-23 PROCEDURE — 3075F SYST BP GE 130 - 139MM HG: CPT | Performed by: NURSE PRACTITIONER

## 2024-07-23 ASSESSMENT — ENCOUNTER SYMPTOMS
BACK PAIN: 0
ALLERGIC/IMMUNOLOGIC NEGATIVE: 1
RESPIRATORY NEGATIVE: 1
COUGH: 0
SHORTNESS OF BREATH: 0
ABDOMINAL DISTENTION: 0
GASTROINTESTINAL NEGATIVE: 1

## 2024-09-07 ENCOUNTER — HOSPITAL ENCOUNTER (OUTPATIENT)
Age: 54
Discharge: HOME | End: 2024-09-07
Payer: COMMERCIAL

## 2024-09-07 DIAGNOSIS — I10 PRIMARY HYPERTENSION: Chronic | ICD-10-CM

## 2024-09-07 DIAGNOSIS — E78.2 MIXED HYPERLIPIDEMIA: Chronic | ICD-10-CM

## 2024-09-07 DIAGNOSIS — E03.9 HYPOTHYROIDISM, UNSPECIFIED TYPE: ICD-10-CM

## 2024-09-07 DIAGNOSIS — K58.0 IRRITABLE BOWEL SYNDROME WITH DIARRHEA: ICD-10-CM

## 2024-09-07 DIAGNOSIS — E83.52 HYPERCALCEMIA: ICD-10-CM

## 2024-09-07 DIAGNOSIS — R73.01 IFG (IMPAIRED FASTING GLUCOSE): ICD-10-CM

## 2024-09-07 LAB
ALBUMIN SERPL-MCNC: 4.7 G/DL (ref 3.5–5.2)
ALBUMIN/GLOB SERPL: 2 {RATIO} (ref 1–2.5)
ALP SERPL-CCNC: 60 U/L (ref 35–104)
ALT SERPL-CCNC: 26 U/L (ref 10–35)
ANION GAP SERPL CALCULATED.3IONS-SCNC: 10 MMOL/L (ref 9–16)
AST SERPL-CCNC: 27 U/L (ref 10–35)
BASOPHILS # BLD: 0.04 K/UL (ref 0–0.2)
BASOPHILS NFR BLD: 1 % (ref 0–2)
BILIRUB SERPL-MCNC: 0.4 MG/DL (ref 0–1.2)
BUN SERPL-MCNC: 12 MG/DL (ref 6–20)
CALCIUM SERPL-MCNC: 9.6 MG/DL (ref 8.6–10.4)
CHLORIDE SERPL-SCNC: 101 MMOL/L (ref 98–107)
CHOLEST SERPL-MCNC: 191 MG/DL (ref 0–199)
CHOLESTEROL/HDL RATIO: 3
CO2 SERPL-SCNC: 27 MMOL/L (ref 20–31)
CREAT SERPL-MCNC: 1 MG/DL (ref 0.5–0.9)
EOSINOPHIL # BLD: 0.28 K/UL (ref 0–0.44)
EOSINOPHILS RELATIVE PERCENT: 5 % (ref 1–4)
ERYTHROCYTE [DISTWIDTH] IN BLOOD BY AUTOMATED COUNT: 12.3 % (ref 11.8–14.4)
EST. AVERAGE GLUCOSE BLD GHB EST-MCNC: 91 MG/DL
GFR, ESTIMATED: 69 ML/MIN/1.73M2
GLUCOSE SERPL-MCNC: 99 MG/DL (ref 74–99)
HBA1C MFR BLD: 4.8 % (ref 4–6)
HCT VFR BLD AUTO: 42 % (ref 36.3–47.1)
HDLC SERPL-MCNC: 57 MG/DL
HGB BLD-MCNC: 14 G/DL (ref 11.9–15.1)
IMM GRANULOCYTES # BLD AUTO: <0.03 K/UL (ref 0–0.3)
IMM GRANULOCYTES NFR BLD: 0 %
LDLC SERPL CALC-MCNC: 115 MG/DL (ref 0–100)
LYMPHOCYTES NFR BLD: 1.2 K/UL (ref 1.1–3.7)
LYMPHOCYTES RELATIVE PERCENT: 23 % (ref 24–43)
MCH RBC QN AUTO: 32.5 PG (ref 25.2–33.5)
MCHC RBC AUTO-ENTMCNC: 33.3 G/DL (ref 28.4–34.8)
MCV RBC AUTO: 97.4 FL (ref 82.6–102.9)
MONOCYTES NFR BLD: 0.42 K/UL (ref 0.1–1.2)
MONOCYTES NFR BLD: 8 % (ref 3–12)
NEUTROPHILS NFR BLD: 63 % (ref 36–65)
NEUTS SEG NFR BLD: 3.18 K/UL (ref 1.5–8.1)
NRBC BLD-RTO: 0 PER 100 WBC
PLATELET # BLD AUTO: 288 K/UL (ref 138–453)
PMV BLD AUTO: 10.8 FL (ref 8.1–13.5)
POTASSIUM SERPL-SCNC: 4.4 MMOL/L (ref 3.7–5.3)
PROT SERPL-MCNC: 7.1 G/DL (ref 6.6–8.7)
RBC # BLD AUTO: 4.31 M/UL (ref 3.95–5.11)
SODIUM SERPL-SCNC: 138 MMOL/L (ref 136–145)
T4 FREE SERPL-MCNC: 1.5 NG/DL (ref 0.92–1.68)
TRIGL SERPL-MCNC: 94 MG/DL
TSH SERPL DL<=0.05 MIU/L-ACNC: 0.25 UIU/ML (ref 0.27–4.2)
VLDLC SERPL CALC-MCNC: 19 MG/DL
WBC OTHER # BLD: 5.1 K/UL (ref 3.5–11.3)

## 2024-09-07 PROCEDURE — 80061 LIPID PANEL: CPT

## 2024-09-07 PROCEDURE — 84439 ASSAY OF FREE THYROXINE: CPT

## 2024-09-07 PROCEDURE — 83036 HEMOGLOBIN GLYCOSYLATED A1C: CPT

## 2024-09-07 PROCEDURE — 85025 COMPLETE CBC W/AUTO DIFF WBC: CPT

## 2024-09-07 PROCEDURE — 80053 COMPREHEN METABOLIC PANEL: CPT

## 2024-09-07 PROCEDURE — 36415 COLL VENOUS BLD VENIPUNCTURE: CPT

## 2024-09-07 PROCEDURE — 84443 ASSAY THYROID STIM HORMONE: CPT

## 2024-09-09 LAB — PTH-INTACT SERPL-MCNC: 37 PG/ML (ref 15–65)

## 2024-09-13 ENCOUNTER — OFFICE VISIT (OUTPATIENT)
Dept: FAMILY MEDICINE CLINIC | Age: 54
End: 2024-09-13
Payer: COMMERCIAL

## 2024-09-13 VITALS
DIASTOLIC BLOOD PRESSURE: 74 MMHG | HEART RATE: 95 BPM | BODY MASS INDEX: 32.01 KG/M2 | OXYGEN SATURATION: 98 % | SYSTOLIC BLOOD PRESSURE: 124 MMHG | WEIGHT: 175 LBS

## 2024-09-13 DIAGNOSIS — B35.1 ONYCHOMYCOSIS: ICD-10-CM

## 2024-09-13 DIAGNOSIS — F41.9 ANXIETY: ICD-10-CM

## 2024-09-13 DIAGNOSIS — G47.9 SLEEP TROUBLE: ICD-10-CM

## 2024-09-13 DIAGNOSIS — E03.9 HYPOTHYROIDISM, UNSPECIFIED TYPE: ICD-10-CM

## 2024-09-13 DIAGNOSIS — I10 PRIMARY HYPERTENSION: Primary | Chronic | ICD-10-CM

## 2024-09-13 PROCEDURE — G8417 CALC BMI ABV UP PARAM F/U: HCPCS | Performed by: FAMILY MEDICINE

## 2024-09-13 PROCEDURE — 3078F DIAST BP <80 MM HG: CPT | Performed by: FAMILY MEDICINE

## 2024-09-13 PROCEDURE — 3017F COLORECTAL CA SCREEN DOC REV: CPT | Performed by: FAMILY MEDICINE

## 2024-09-13 PROCEDURE — 99214 OFFICE O/P EST MOD 30 MIN: CPT | Performed by: FAMILY MEDICINE

## 2024-09-13 PROCEDURE — 1036F TOBACCO NON-USER: CPT | Performed by: FAMILY MEDICINE

## 2024-09-13 PROCEDURE — G8427 DOCREV CUR MEDS BY ELIG CLIN: HCPCS | Performed by: FAMILY MEDICINE

## 2024-09-13 PROCEDURE — 3074F SYST BP LT 130 MM HG: CPT | Performed by: FAMILY MEDICINE

## 2024-09-13 RX ORDER — TERBINAFINE HYDROCHLORIDE 250 MG/1
250 TABLET ORAL DAILY
Qty: 84 TABLET | Refills: 0 | Status: SHIPPED | OUTPATIENT
Start: 2024-09-13 | End: 2024-12-06

## 2024-09-13 ASSESSMENT — ENCOUNTER SYMPTOMS
BLURRED VISION: 0
EYES NEGATIVE: 1
ALLERGIC/IMMUNOLOGIC NEGATIVE: 1
GASTROINTESTINAL NEGATIVE: 1
ORTHOPNEA: 0
RESPIRATORY NEGATIVE: 1

## 2024-10-06 DIAGNOSIS — I10 PRIMARY HYPERTENSION: Chronic | ICD-10-CM

## 2024-10-08 RX ORDER — LISINOPRIL AND HYDROCHLOROTHIAZIDE 12.5; 2 MG/1; MG/1
1 TABLET ORAL DAILY
Qty: 90 TABLET | Refills: 1 | Status: SHIPPED | OUTPATIENT
Start: 2024-10-08

## 2024-10-08 NOTE — TELEPHONE ENCOUNTER
Keri Scruggs is calling to request a refill on the following medication(s):    Medication Request:  Requested Prescriptions     Pending Prescriptions Disp Refills    lisinopril-hydroCHLOROthiazide (PRINZIDE;ZESTORETIC) 20-12.5 MG per tablet [Pharmacy Med Name: LISINOPRIL-HCTZ 20/12.5MG TABLETS] 90 tablet 1     Sig: TAKE 1 TABLET BY MOUTH DAILY       Last Visit Date (If Applicable):  9/13/2024    Next Visit Date:    2/24/2025 -New to Provider                To pharmacy: ZERO refills remain on this prescription. Your patient is requesting advance approval of refills for this medication to PREVENT ANY MISSED DOSES

## 2024-11-23 ENCOUNTER — OFFICE VISIT (OUTPATIENT)
Dept: PRIMARY CARE CLINIC | Age: 54
End: 2024-11-23
Payer: COMMERCIAL

## 2024-11-23 VITALS
TEMPERATURE: 98.1 F | HEART RATE: 98 BPM | SYSTOLIC BLOOD PRESSURE: 163 MMHG | DIASTOLIC BLOOD PRESSURE: 94 MMHG | OXYGEN SATURATION: 96 % | BODY MASS INDEX: 31.01 KG/M2 | HEIGHT: 63 IN | WEIGHT: 175 LBS

## 2024-11-23 DIAGNOSIS — J02.0 ACUTE STREPTOCOCCAL PHARYNGITIS: Primary | ICD-10-CM

## 2024-11-23 DIAGNOSIS — L03.011 ACUTE PARONYCHIA OF FINGER, RIGHT: ICD-10-CM

## 2024-11-23 DIAGNOSIS — J02.9 SORE THROAT: ICD-10-CM

## 2024-11-23 LAB — S PYO AG THROAT QL: POSITIVE

## 2024-11-23 PROCEDURE — G8417 CALC BMI ABV UP PARAM F/U: HCPCS | Performed by: NURSE PRACTITIONER

## 2024-11-23 PROCEDURE — 87880 STREP A ASSAY W/OPTIC: CPT | Performed by: NURSE PRACTITIONER

## 2024-11-23 PROCEDURE — 3080F DIAST BP >= 90 MM HG: CPT | Performed by: NURSE PRACTITIONER

## 2024-11-23 PROCEDURE — 3077F SYST BP >= 140 MM HG: CPT | Performed by: NURSE PRACTITIONER

## 2024-11-23 PROCEDURE — 1036F TOBACCO NON-USER: CPT | Performed by: NURSE PRACTITIONER

## 2024-11-23 PROCEDURE — 3017F COLORECTAL CA SCREEN DOC REV: CPT | Performed by: NURSE PRACTITIONER

## 2024-11-23 PROCEDURE — 99213 OFFICE O/P EST LOW 20 MIN: CPT | Performed by: NURSE PRACTITIONER

## 2024-11-23 PROCEDURE — G8427 DOCREV CUR MEDS BY ELIG CLIN: HCPCS | Performed by: NURSE PRACTITIONER

## 2024-11-23 PROCEDURE — G8484 FLU IMMUNIZE NO ADMIN: HCPCS | Performed by: NURSE PRACTITIONER

## 2024-11-23 ASSESSMENT — ENCOUNTER SYMPTOMS
VOICE CHANGE: 0
CHEST TIGHTNESS: 0
EYE DISCHARGE: 0
SINUS PRESSURE: 0
SORE THROAT: 1
COUGH: 1
WHEEZING: 0
SHORTNESS OF BREATH: 0
EYE REDNESS: 0

## 2024-11-23 NOTE — PROGRESS NOTES
times daily for 10 days     Dispense:  20 tablet     Refill:  0        Patient given educational materials - see patient instructions.Discussed use, benefit, and side effects of prescribed medications.  All patientquestions answered.  Pt voiced understanding.    Electronically signed by CASSANDRA Dumont CNP on 11/23/2024at 10:43 AM

## 2024-11-27 DIAGNOSIS — G47.9 SLEEP TROUBLE: ICD-10-CM

## 2024-11-27 RX ORDER — TRAZODONE HYDROCHLORIDE 150 MG/1
150 TABLET ORAL NIGHTLY
Qty: 90 TABLET | Refills: 1 | Status: SHIPPED | OUTPATIENT
Start: 2024-11-27

## 2024-11-27 NOTE — TELEPHONE ENCOUNTER
Keri Scruggs is calling to request a refill on the following medication(s):    Medication Request:  Requested Prescriptions     Pending Prescriptions Disp Refills    traZODone (DESYREL) 150 MG tablet 90 tablet 3       Last Visit Date (If Applicable):  9/13/2024    Next Visit Date:    02/24/2025Sushil Manzano

## 2024-12-20 RX ORDER — FLUTICASONE PROPIONATE 50 MCG
2 SPRAY, SUSPENSION (ML) NASAL DAILY
Qty: 48 G | Refills: 3 | Status: SHIPPED | OUTPATIENT
Start: 2024-12-20

## 2024-12-20 NOTE — TELEPHONE ENCOUNTER
Keri Scruggs is calling to request a refill on the following medication(s):    Medication Request:  Requested Prescriptions     Pending Prescriptions Disp Refills    fluticasone (FLONASE) 50 MCG/ACT nasal spray 48 g 3     Si sprays by Nasal route daily       Last Visit Date (If Applicable):  2024    Next Visit Date:    2025Sushil Manzano

## 2025-01-07 ENCOUNTER — TELEPHONE (OUTPATIENT)
Dept: FAMILY MEDICINE CLINIC | Age: 55
End: 2025-01-07

## 2025-01-07 DIAGNOSIS — Z12.31 BREAST CANCER SCREENING BY MAMMOGRAM: Primary | ICD-10-CM

## 2025-01-07 NOTE — TELEPHONE ENCOUNTER
The patient is scheduled to establish with Dr. Manzano on 2/24/25. She called in stating that she typically gets her mammogram performed in January and wondered if it would be possible to get an order before being evaluated? I advised that the provider may want to evaluate/establish with the patient first, but stated I would check.

## 2025-01-18 DIAGNOSIS — I10 PRIMARY HYPERTENSION: Chronic | ICD-10-CM

## 2025-01-20 RX ORDER — LISINOPRIL AND HYDROCHLOROTHIAZIDE 12.5; 2 MG/1; MG/1
1 TABLET ORAL DAILY
Qty: 90 TABLET | Refills: 1 | Status: SHIPPED | OUTPATIENT
Start: 2025-01-20

## 2025-01-20 NOTE — TELEPHONE ENCOUNTER
Keri Scruggs is calling to request a refill on the following medication(s):    Medication Request:  Requested Prescriptions     Pending Prescriptions Disp Refills    lisinopril-hydroCHLOROthiazide (PRINZIDE;ZESTORETIC) 20-12.5 MG per tablet 90 tablet 1     Sig: Take 1 tablet by mouth daily       Last Visit Date (If Applicable):  7/5/2024    Next Visit Date:    Visit date not found

## 2025-01-25 ENCOUNTER — HOSPITAL ENCOUNTER (OUTPATIENT)
Dept: MAMMOGRAPHY | Age: 55
Discharge: HOME | End: 2025-01-27
Payer: COMMERCIAL

## 2025-01-25 VITALS — BODY MASS INDEX: 31.01 KG/M2 | HEIGHT: 63 IN | WEIGHT: 175 LBS

## 2025-01-25 DIAGNOSIS — Z12.31 BREAST CANCER SCREENING BY MAMMOGRAM: ICD-10-CM

## 2025-01-25 PROCEDURE — 77063 BREAST TOMOSYNTHESIS BI: CPT

## 2025-01-29 ENCOUNTER — HOSPITAL ENCOUNTER (OUTPATIENT)
Age: 55
Setting detail: SPECIMEN
Discharge: HOME | End: 2025-01-29

## 2025-01-29 ENCOUNTER — OFFICE VISIT (OUTPATIENT)
Dept: PRIMARY CARE CLINIC | Age: 55
End: 2025-01-29
Payer: COMMERCIAL

## 2025-01-29 VITALS
OXYGEN SATURATION: 98 % | DIASTOLIC BLOOD PRESSURE: 92 MMHG | SYSTOLIC BLOOD PRESSURE: 141 MMHG | TEMPERATURE: 98.9 F | HEART RATE: 110 BPM

## 2025-01-29 DIAGNOSIS — R30.0 DYSURIA: ICD-10-CM

## 2025-01-29 DIAGNOSIS — E03.9 HYPOTHYROIDISM, UNSPECIFIED TYPE: ICD-10-CM

## 2025-01-29 DIAGNOSIS — N30.00 ACUTE CYSTITIS WITHOUT HEMATURIA: Primary | ICD-10-CM

## 2025-01-29 DIAGNOSIS — J32.9 RHINOSINUSITIS: ICD-10-CM

## 2025-01-29 PROCEDURE — 3017F COLORECTAL CA SCREEN DOC REV: CPT

## 2025-01-29 PROCEDURE — 3080F DIAST BP >= 90 MM HG: CPT

## 2025-01-29 PROCEDURE — 99213 OFFICE O/P EST LOW 20 MIN: CPT

## 2025-01-29 PROCEDURE — G8417 CALC BMI ABV UP PARAM F/U: HCPCS

## 2025-01-29 PROCEDURE — G8427 DOCREV CUR MEDS BY ELIG CLIN: HCPCS

## 2025-01-29 PROCEDURE — 3077F SYST BP >= 140 MM HG: CPT

## 2025-01-29 PROCEDURE — 1036F TOBACCO NON-USER: CPT

## 2025-01-29 RX ORDER — CEPHALEXIN 500 MG/1
500 CAPSULE ORAL 2 TIMES DAILY
Qty: 14 CAPSULE | Refills: 0 | Status: SHIPPED | OUTPATIENT
Start: 2025-01-29 | End: 2025-02-05

## 2025-01-29 ASSESSMENT — ENCOUNTER SYMPTOMS
SINUS PRESSURE: 1
ABDOMINAL DISTENTION: 1
DIARRHEA: 0
SINUS PAIN: 0
COUGH: 0
VOMITING: 0
CONSTIPATION: 0
BACK PAIN: 0
RESPIRATORY NEGATIVE: 1
VOICE CHANGE: 0
PHOTOPHOBIA: 0
TROUBLE SWALLOWING: 0
FACIAL SWELLING: 0
WHEEZING: 0
EYE PAIN: 0
BLOOD IN STOOL: 0
EYE DISCHARGE: 0
ABDOMINAL PAIN: 0
EYE REDNESS: 0
EYES NEGATIVE: 1
CHOKING: 0
STRIDOR: 0
EYE ITCHING: 0
SORE THROAT: 0
ANAL BLEEDING: 0
COLOR CHANGE: 0
NAUSEA: 0
APNEA: 0
RECTAL PAIN: 0
RHINORRHEA: 0
CHEST TIGHTNESS: 0
SHORTNESS OF BREATH: 0

## 2025-01-29 NOTE — TELEPHONE ENCOUNTER
Keri Scruggs is calling to request a refill on the following medication(s):    Medication Request:  Requested Prescriptions     Pending Prescriptions Disp Refills    levothyroxine (SYNTHROID) 100 MCG tablet 90 tablet 3     Sig: Take 1 tablet by mouth every morning       Last Visit Date (If Applicable):  Visit date not found    Next Visit Date:    2/24/2025

## 2025-01-29 NOTE — PROGRESS NOTES
[facility], [facility]  [address]  [place] [address]    [facility]  [address]  [facility] [address]  Dept: [phone]     Keri Scruggs is a 54 y.o. female Established patient, who presents to the walk-in clinic today with conditions/complaints as noted below:    Chief Complaint   Patient presents with    Urinary Tract Infection     Lower abdominal pressure, frequent urination x 1 day          HPI:     Urinary Tract Infection  This is a new problem. The current episode started today. The problem has been gradually worsening since onset. Pertinent negatives include no hematuria or pain.     Pt also mentions sinusitis symptoms X3 weeks ago. She has congestion, post nasal drip, sinus pressure.     Past Medical History:   Diagnosis Date    Cough     phlegm    Hyperlipidemia     Hypertension     Hypothyroid     Menorrhagia     Seasonal allergies     Sinus headache     Uterine fibroid     Wears glasses     Wellness examination     PCP Dr Jaswant Vidales       Current Outpatient Medications   Medication Sig Dispense Refill    cephALEXin (KEFLEX) 500 MG capsule Take 1 capsule by mouth 2 times daily for 7 days 14 capsule 0    lisinopril-hydroCHLOROthiazide (PRINZIDE;ZESTORETIC) 20-12.5 MG per tablet Take 1 tablet by mouth daily 90 tablet 1    fluticasone (FLONASE) 50 MCG/ACT nasal spray 2 sprays by Nasal route daily 48 g 3    traZODone (DESYREL) 150 MG tablet Take 1 tablet by mouth nightly 90 tablet 1    azelastine (ASTELIN) 0.1 % nasal spray 2 sprays by Nasal route 2 times daily Use in each nostril as directed 120 mL 1    simvastatin (ZOCOR) 40 MG tablet TAKE 1 TABLET DAILY WITH BREAKFAST 90 tablet 3    dicyclomine (BENTYL) 20 MG tablet TAKE 1 TABLET IN THE MORNING AND AT BEDTIME 180 tablet 3    levothyroxine (SYNTHROID) 100 MCG tablet TAKE 1 TABLET

## 2025-01-30 DIAGNOSIS — R30.0 DYSURIA: ICD-10-CM

## 2025-01-30 RX ORDER — LEVOTHYROXINE SODIUM 100 UG/1
100 TABLET ORAL EVERY MORNING
Qty: 90 TABLET | Refills: 3 | Status: SHIPPED | OUTPATIENT
Start: 2025-01-30

## 2025-02-01 LAB
MICROORGANISM SPEC CULT: ABNORMAL
SERVICE CMNT-IMP: ABNORMAL
SPECIMEN DESCRIPTION: ABNORMAL

## 2025-02-11 DIAGNOSIS — K58.9 IRRITABLE BOWEL SYNDROME, UNSPECIFIED TYPE: ICD-10-CM

## 2025-02-11 NOTE — TELEPHONE ENCOUNTER
Keri Scruggs is calling to request a refill on the following medication(s):    Medication Request:  Requested Prescriptions     Pending Prescriptions Disp Refills    dicyclomine (BENTYL) 20 MG tablet 180 tablet 3       Last Visit Date (If Applicable):  Visit date not found    Next Visit Date:    2/24/2025

## 2025-02-12 RX ORDER — DICYCLOMINE HCL 20 MG
20 TABLET ORAL EVERY 6 HOURS PRN
Qty: 180 TABLET | Refills: 3 | Status: SHIPPED | OUTPATIENT
Start: 2025-02-12

## 2025-02-23 SDOH — HEALTH STABILITY: PHYSICAL HEALTH: ON AVERAGE, HOW MANY DAYS PER WEEK DO YOU ENGAGE IN MODERATE TO STRENUOUS EXERCISE (LIKE A BRISK WALK)?: 1 DAY

## 2025-02-23 SDOH — HEALTH STABILITY: PHYSICAL HEALTH: ON AVERAGE, HOW MANY MINUTES DO YOU ENGAGE IN EXERCISE AT THIS LEVEL?: 30 MIN

## 2025-02-24 ENCOUNTER — OFFICE VISIT (OUTPATIENT)
Dept: FAMILY MEDICINE CLINIC | Age: 55
End: 2025-02-24
Payer: COMMERCIAL

## 2025-02-24 VITALS
TEMPERATURE: 97.4 F | OXYGEN SATURATION: 97 % | BODY MASS INDEX: 31 KG/M2 | WEIGHT: 175 LBS | SYSTOLIC BLOOD PRESSURE: 124 MMHG | DIASTOLIC BLOOD PRESSURE: 72 MMHG | HEART RATE: 102 BPM

## 2025-02-24 DIAGNOSIS — E03.9 HYPOTHYROIDISM, UNSPECIFIED TYPE: ICD-10-CM

## 2025-02-24 DIAGNOSIS — Z12.11 SCREENING FOR COLON CANCER: ICD-10-CM

## 2025-02-24 DIAGNOSIS — I10 PRIMARY HYPERTENSION: Chronic | ICD-10-CM

## 2025-02-24 DIAGNOSIS — Z00.00 HEALTH CARE MAINTENANCE: ICD-10-CM

## 2025-02-24 DIAGNOSIS — G47.9 SLEEP TROUBLE: ICD-10-CM

## 2025-02-24 DIAGNOSIS — F41.9 ANXIETY: ICD-10-CM

## 2025-02-24 DIAGNOSIS — K58.0 IRRITABLE BOWEL SYNDROME WITH DIARRHEA: ICD-10-CM

## 2025-02-24 DIAGNOSIS — E78.2 MIXED HYPERLIPIDEMIA: Chronic | ICD-10-CM

## 2025-02-24 DIAGNOSIS — R00.0 SINUS TACHYCARDIA: Primary | ICD-10-CM

## 2025-02-24 PROCEDURE — 3074F SYST BP LT 130 MM HG: CPT

## 2025-02-24 PROCEDURE — G8417 CALC BMI ABV UP PARAM F/U: HCPCS

## 2025-02-24 PROCEDURE — 90471 IMMUNIZATION ADMIN: CPT

## 2025-02-24 PROCEDURE — 3017F COLORECTAL CA SCREEN DOC REV: CPT

## 2025-02-24 PROCEDURE — 90661 CCIIV3 VAC ABX FR 0.5 ML IM: CPT

## 2025-02-24 PROCEDURE — 90677 PCV20 VACCINE IM: CPT

## 2025-02-24 PROCEDURE — G8427 DOCREV CUR MEDS BY ELIG CLIN: HCPCS

## 2025-02-24 PROCEDURE — 3078F DIAST BP <80 MM HG: CPT

## 2025-02-24 PROCEDURE — 90472 IMMUNIZATION ADMIN EACH ADD: CPT

## 2025-02-24 PROCEDURE — 1036F TOBACCO NON-USER: CPT

## 2025-02-24 PROCEDURE — 99215 OFFICE O/P EST HI 40 MIN: CPT

## 2025-02-24 RX ORDER — ALPRAZOLAM 0.25 MG
0.25 TABLET ORAL 3 TIMES DAILY PRN
Qty: 3 TABLET | Refills: 0 | Status: SHIPPED | OUTPATIENT
Start: 2025-02-24 | End: 2025-02-27

## 2025-02-24 SDOH — ECONOMIC STABILITY: FOOD INSECURITY: WITHIN THE PAST 12 MONTHS, YOU WORRIED THAT YOUR FOOD WOULD RUN OUT BEFORE YOU GOT MONEY TO BUY MORE.: NEVER TRUE

## 2025-02-24 SDOH — ECONOMIC STABILITY: FOOD INSECURITY: WITHIN THE PAST 12 MONTHS, THE FOOD YOU BOUGHT JUST DIDN'T LAST AND YOU DIDN'T HAVE MONEY TO GET MORE.: NEVER TRUE

## 2025-02-24 ASSESSMENT — PATIENT HEALTH QUESTIONNAIRE - PHQ9
6. FEELING BAD ABOUT YOURSELF - OR THAT YOU ARE A FAILURE OR HAVE LET YOURSELF OR YOUR FAMILY DOWN: NOT AT ALL
SUM OF ALL RESPONSES TO PHQ QUESTIONS 1-9: 1
SUM OF ALL RESPONSES TO PHQ9 QUESTIONS 1 & 2: 0
SUM OF ALL RESPONSES TO PHQ QUESTIONS 1-9: 1
8. MOVING OR SPEAKING SO SLOWLY THAT OTHER PEOPLE COULD HAVE NOTICED. OR THE OPPOSITE, BEING SO FIGETY OR RESTLESS THAT YOU HAVE BEEN MOVING AROUND A LOT MORE THAN USUAL: NOT AT ALL
5. POOR APPETITE OR OVEREATING: NOT AT ALL
10. IF YOU CHECKED OFF ANY PROBLEMS, HOW DIFFICULT HAVE THESE PROBLEMS MADE IT FOR YOU TO DO YOUR WORK, TAKE CARE OF THINGS AT HOME, OR GET ALONG WITH OTHER PEOPLE: NOT DIFFICULT AT ALL
9. THOUGHTS THAT YOU WOULD BE BETTER OFF DEAD, OR OF HURTING YOURSELF: NOT AT ALL
1. LITTLE INTEREST OR PLEASURE IN DOING THINGS: NOT AT ALL
SUM OF ALL RESPONSES TO PHQ QUESTIONS 1-9: 1
7. TROUBLE CONCENTRATING ON THINGS, SUCH AS READING THE NEWSPAPER OR WATCHING TELEVISION: NOT AT ALL
2. FEELING DOWN, DEPRESSED OR HOPELESS: NOT AT ALL
3. TROUBLE FALLING OR STAYING ASLEEP: SEVERAL DAYS
4. FEELING TIRED OR HAVING LITTLE ENERGY: NOT AT ALL
SUM OF ALL RESPONSES TO PHQ QUESTIONS 1-9: 1

## 2025-02-24 ASSESSMENT — ENCOUNTER SYMPTOMS
SHORTNESS OF BREATH: 0
WHEEZING: 0
DIARRHEA: 1

## 2025-02-24 NOTE — PROGRESS NOTES
necessitate emergent medical attention going to ER.     All patient questions answered.  Pt voiced understanding.     No follow-ups on file.      Disclaimer: Some orall of this note was transcribed using voice-recognition software.This may cause typographical errors occasionally. Although all effort is made to fix these errors, please do not hesitate to contact our office if there isany concern with the understanding of this note.      Electronically signed by CLIFTON GALINDO MD on 2/24/2025 at 4:12 PM

## 2025-02-24 NOTE — ASSESSMENT & PLAN NOTE
Orders:    FIT-DNA (Cologuard)    
 Chronic, at goal (stable), Her blood pressure is well-controlled with Prinzide., medication adherence emphasized, and lifestyle modifications recommended         
 Chronic, at goal (stable), She experiences diarrhea and takes Bentyl once at night and once in the morning, which helps manage her symptoms. She knows that eating too much chocolate can worsen her condition., medication adherence emphasized, and lifestyle modifications recommended         
 Chronic, at goal (stable), She is currently on trazodone for sleep., medication adherence emphasized, and lifestyle modifications recommended         
 Chronic, at goal (stable), will check thyroid function , medication adherence emphasized, and lifestyle modifications recommended         
 Chronic, not at goal (unstable), . Her blood pressure is well-controlled with Prinzide, but her heart rate is elevated, a condition known as sinus tachycardia, which is common in individuals with anxiety. The highest recorded heart rate in her chart is 110, which is typically asymptomatic and often associated with anxiety         
 Chronic, not at goal (unstable), . Her blood pressure is well-controlled with Prinzide, but her heart rate is elevated, a condition known as sinus tachycardia, which is common in individuals with anxiety. The highest recorded heart rate in her chart is 110, which is typically asymptomatic and often associated with anxiety,She usually takes Xanax before she gets on the airplane and has some left from a previous prescription. She only takes it for that purpose. She does not always need it for the plane, but sometimes she does. Dr. Vidales prescribed her Xanax 0.25 mg last year., 3 tablets given , medication adherence emphasized, and lifestyle modifications recommended         
 Chronic, not at goal (unstable), Her cholesterol levels were slightly elevated in September 2024, but not to the extent that would necessitate an increase in medication dosage. She is currently on Zocor 40 mg., medication adherence emphasized, and lifestyle modifications recommended         
osteoporosis screening begins at age 65. She was advised to reduce her caffeine intake. A comprehensive blood panel will be ordered, including hemoglobin, kidney function, liver function, magnesium, thyroid, and cholesterol. A Cologuard test will be ordered. She received influenza and pneumonia vaccines today. A prescription for Xanax 0.25 mg was provided for her upcoming flights. If she experiences chest pain, lightheadedness, or fainting, she should seek immediate medical attention at the emergency room. If her heart rate exceeds 120, it will be classified as supraventricular tachycardia, which may become symptomatic and require further evaluation.

## 2025-03-01 ENCOUNTER — HOSPITAL ENCOUNTER (OUTPATIENT)
Age: 55
Discharge: HOME | End: 2025-03-01
Payer: COMMERCIAL

## 2025-03-01 DIAGNOSIS — E78.2 MIXED HYPERLIPIDEMIA: Chronic | ICD-10-CM

## 2025-03-01 DIAGNOSIS — E03.9 HYPOTHYROIDISM, UNSPECIFIED TYPE: ICD-10-CM

## 2025-03-01 DIAGNOSIS — I10 PRIMARY HYPERTENSION: Chronic | ICD-10-CM

## 2025-03-01 LAB
ALBUMIN SERPL-MCNC: 4.3 G/DL (ref 3.5–5.2)
ALBUMIN/GLOB SERPL: 1.8 {RATIO} (ref 1–2.5)
ALP SERPL-CCNC: 63 U/L (ref 35–104)
ALT SERPL-CCNC: 21 U/L (ref 10–35)
ANION GAP SERPL CALCULATED.3IONS-SCNC: 12 MMOL/L (ref 9–16)
AST SERPL-CCNC: 24 U/L (ref 10–35)
BASOPHILS # BLD: 0.03 K/UL (ref 0–0.2)
BASOPHILS NFR BLD: 1 % (ref 0–2)
BILIRUB SERPL-MCNC: 0.2 MG/DL (ref 0–1.2)
BUN SERPL-MCNC: 11 MG/DL (ref 6–20)
CALCIUM SERPL-MCNC: 9.6 MG/DL (ref 8.6–10.4)
CHLORIDE SERPL-SCNC: 104 MMOL/L (ref 98–107)
CHOLEST SERPL-MCNC: 165 MG/DL (ref 0–199)
CHOLESTEROL/HDL RATIO: 3.3
CO2 SERPL-SCNC: 25 MMOL/L (ref 20–31)
CREAT SERPL-MCNC: 0.8 MG/DL (ref 0.6–0.9)
EOSINOPHIL # BLD: 0.31 K/UL (ref 0–0.44)
EOSINOPHILS RELATIVE PERCENT: 7 % (ref 1–4)
ERYTHROCYTE [DISTWIDTH] IN BLOOD BY AUTOMATED COUNT: 12.4 % (ref 11.8–14.4)
GFR, ESTIMATED: 88 ML/MIN/1.73M2
GLUCOSE SERPL-MCNC: 92 MG/DL (ref 74–99)
HCT VFR BLD AUTO: 39.5 % (ref 36.3–47.1)
HDLC SERPL-MCNC: 50 MG/DL
HGB BLD-MCNC: 12.9 G/DL (ref 11.9–15.1)
IMM GRANULOCYTES # BLD AUTO: <0.03 K/UL (ref 0–0.3)
IMM GRANULOCYTES NFR BLD: 0 %
LDLC SERPL CALC-MCNC: 99 MG/DL (ref 0–100)
LYMPHOCYTES NFR BLD: 0.94 K/UL (ref 1.1–3.7)
LYMPHOCYTES RELATIVE PERCENT: 20 % (ref 24–43)
MAGNESIUM SERPL-MCNC: 2 MG/DL (ref 1.6–2.6)
MCH RBC QN AUTO: 31.2 PG (ref 25.2–33.5)
MCHC RBC AUTO-ENTMCNC: 32.7 G/DL (ref 28.4–34.8)
MCV RBC AUTO: 95.6 FL (ref 82.6–102.9)
MONOCYTES NFR BLD: 0.49 K/UL (ref 0.1–1.2)
MONOCYTES NFR BLD: 11 % (ref 3–12)
NEUTROPHILS NFR BLD: 61 % (ref 36–65)
NEUTS SEG NFR BLD: 2.84 K/UL (ref 1.5–8.1)
NRBC BLD-RTO: 0 PER 100 WBC
PLATELET # BLD AUTO: 298 K/UL (ref 138–453)
PMV BLD AUTO: 10.2 FL (ref 8.1–13.5)
POTASSIUM SERPL-SCNC: 4 MMOL/L (ref 3.7–5.3)
PROT SERPL-MCNC: 6.7 G/DL (ref 6.6–8.7)
RBC # BLD AUTO: 4.13 M/UL (ref 3.95–5.11)
SODIUM SERPL-SCNC: 141 MMOL/L (ref 136–145)
T4 SERPL-MCNC: 7.6 UG/DL (ref 4.5–11.7)
TRIGL SERPL-MCNC: 78 MG/DL (ref 0–149)
TSH SERPL DL<=0.05 MIU/L-ACNC: 0.29 UIU/ML (ref 0.27–4.2)
VLDLC SERPL CALC-MCNC: 16 MG/DL (ref 1–30)
WBC OTHER # BLD: 4.6 K/UL (ref 3.5–11.3)

## 2025-03-01 PROCEDURE — 80061 LIPID PANEL: CPT

## 2025-03-01 PROCEDURE — 84436 ASSAY OF TOTAL THYROXINE: CPT

## 2025-03-01 PROCEDURE — 84443 ASSAY THYROID STIM HORMONE: CPT

## 2025-03-01 PROCEDURE — 36415 COLL VENOUS BLD VENIPUNCTURE: CPT

## 2025-03-01 PROCEDURE — 83735 ASSAY OF MAGNESIUM: CPT

## 2025-03-01 PROCEDURE — 80053 COMPREHEN METABOLIC PANEL: CPT

## 2025-03-01 PROCEDURE — 85025 COMPLETE CBC W/AUTO DIFF WBC: CPT

## 2025-03-03 ENCOUNTER — TELEPHONE (OUTPATIENT)
Dept: FAMILY MEDICINE CLINIC | Age: 55
End: 2025-03-03

## 2025-03-03 NOTE — TELEPHONE ENCOUNTER
Generally be managed at home with symptomatic relief such as oral analgesia tylenol and applying a cold compress to the affected area. Avoid Immobilizing the affected arm as movement will enhance and assist with lymphatic drainage to improve symptoms. If she has fever or issues with moving her arm or worsening of the affected site then she will need to be seen

## 2025-03-03 NOTE — TELEPHONE ENCOUNTER
At patients last visit she received a flu and prevnar vaccine in her right arm and her arm is warm and red at the injection site and is swollen. Patient wants to know what should she do. Please advise

## 2025-03-26 PROBLEM — Z12.11 SCREENING FOR COLON CANCER: Status: RESOLVED | Noted: 2025-02-24 | Resolved: 2025-03-26

## 2025-03-26 PROBLEM — Z00.00 HEALTH CARE MAINTENANCE: Status: RESOLVED | Noted: 2025-02-24 | Resolved: 2025-03-26

## 2025-04-29 LAB — NONINV COLON CA DNA+OCC BLD SCRN STL QL: NEGATIVE

## 2025-05-02 ENCOUNTER — RESULTS FOLLOW-UP (OUTPATIENT)
Dept: FAMILY MEDICINE CLINIC | Age: 55
End: 2025-05-02

## 2025-05-08 DIAGNOSIS — G47.9 SLEEP TROUBLE: ICD-10-CM

## 2025-05-08 RX ORDER — TRAZODONE HYDROCHLORIDE 150 MG/1
150 TABLET ORAL NIGHTLY
Qty: 90 TABLET | Refills: 1 | Status: SHIPPED | OUTPATIENT
Start: 2025-05-08

## 2025-05-08 NOTE — TELEPHONE ENCOUNTER
Keri Scruggs is calling to request a refill on the following medication(s):    Medication Request:  Requested Prescriptions     Pending Prescriptions Disp Refills    traZODone (DESYREL) 150 MG tablet [Pharmacy Med Name: TRAZODONE HCL TABS 150MG] 90 tablet 1     Sig: TAKE 1 TABLET NIGHTLY       Last Visit Date (If Applicable):  2/24/2025    Next Visit Date:    10/27/2025

## 2025-05-23 ENCOUNTER — HOSPITAL ENCOUNTER (OUTPATIENT)
Dept: CT IMAGING | Age: 55
Discharge: HOME | End: 2025-05-25
Payer: COMMERCIAL

## 2025-05-23 ENCOUNTER — HOSPITAL ENCOUNTER (OUTPATIENT)
Age: 55
Setting detail: SPECIMEN
Discharge: HOME | End: 2025-05-23

## 2025-05-23 ENCOUNTER — OFFICE VISIT (OUTPATIENT)
Dept: FAMILY MEDICINE CLINIC | Age: 55
End: 2025-05-23
Payer: COMMERCIAL

## 2025-05-23 ENCOUNTER — HOSPITAL ENCOUNTER (OUTPATIENT)
Age: 55
Discharge: HOME | End: 2025-05-23
Payer: COMMERCIAL

## 2025-05-23 ENCOUNTER — RESULTS FOLLOW-UP (OUTPATIENT)
Dept: FAMILY MEDICINE CLINIC | Age: 55
End: 2025-05-23

## 2025-05-23 VITALS — OXYGEN SATURATION: 97 % | HEART RATE: 105 BPM | TEMPERATURE: 98.9 F

## 2025-05-23 DIAGNOSIS — R31.0 GROSS HEMATURIA: Primary | ICD-10-CM

## 2025-05-23 DIAGNOSIS — R10.9 RT FLANK PAIN: ICD-10-CM

## 2025-05-23 DIAGNOSIS — Z87.442 HISTORY OF KIDNEY STONES: ICD-10-CM

## 2025-05-23 DIAGNOSIS — R31.0 GROSS HEMATURIA: ICD-10-CM

## 2025-05-23 DIAGNOSIS — R50.9 ELEVATED TEMPERATURE: ICD-10-CM

## 2025-05-23 DIAGNOSIS — R74.8 ELEVATED LIVER ENZYMES: ICD-10-CM

## 2025-05-23 LAB
ALBUMIN SERPL-MCNC: 4.5 G/DL (ref 3.5–5.2)
ALP SERPL-CCNC: 71 U/L (ref 35–104)
ALT SERPL-CCNC: 62 U/L (ref 10–35)
ANION GAP SERPL CALCULATED.3IONS-SCNC: 12 MMOL/L (ref 9–16)
AST SERPL-CCNC: 50 U/L (ref 10–35)
BASOPHILS # BLD: 0.03 K/UL (ref 0–0.2)
BASOPHILS NFR BLD: 0 % (ref 0–2)
BILIRUB SERPL-MCNC: 0.4 MG/DL (ref 0–1.2)
BILIRUBIN, POC: ABNORMAL
BLOOD URINE, POC: ABNORMAL
BUN SERPL-MCNC: 13 MG/DL (ref 6–20)
CALCIUM SERPL-MCNC: 9.7 MG/DL (ref 8.6–10.4)
CHLORIDE SERPL-SCNC: 103 MMOL/L (ref 98–107)
CLARITY, POC: ABNORMAL
CO2 SERPL-SCNC: 27 MMOL/L (ref 20–31)
COLOR, POC: ABNORMAL
CREAT SERPL-MCNC: 0.9 MG/DL (ref 0.7–1.2)
EOSINOPHIL # BLD: 0.13 K/UL (ref 0–0.44)
EOSINOPHILS RELATIVE PERCENT: 1 % (ref 0–4)
ERYTHROCYTE [DISTWIDTH] IN BLOOD BY AUTOMATED COUNT: 12.4 % (ref 11.5–14.9)
GFR, ESTIMATED: 75 ML/MIN/1.73M2
GLUCOSE SERPL-MCNC: 115 MG/DL (ref 74–99)
GLUCOSE URINE, POC: ABNORMAL MG/DL
HCT VFR BLD AUTO: 38.2 % (ref 36–46)
HGB BLD-MCNC: 13.8 G/DL (ref 12–16)
IMM GRANULOCYTES # BLD AUTO: 0.04 K/UL (ref 0–0.3)
IMM GRANULOCYTES NFR BLD: 0 %
KETONES, POC: ABNORMAL MG/DL
LEUKOCYTE EST, POC: ABNORMAL
LYMPHOCYTES NFR BLD: 1.66 K/UL (ref 1.1–3.7)
LYMPHOCYTES RELATIVE PERCENT: 18 % (ref 24–44)
MCH RBC QN AUTO: 33.4 PG (ref 26–34)
MCHC RBC AUTO-ENTMCNC: 36.1 G/DL (ref 31–37)
MCV RBC AUTO: 92.5 FL (ref 80–100)
MONOCYTES NFR BLD: 0.55 K/UL (ref 0.1–1.2)
MONOCYTES NFR BLD: 6 % (ref 3–12)
NEUTROPHILS NFR BLD: 75 % (ref 36–66)
NEUTS SEG NFR BLD: 6.71 K/UL (ref 1.5–8.1)
NITRITE, POC: POSITIVE
NRBC BLD-RTO: 0 PER 100 WBC
PH, POC: 6
PLATELET # BLD AUTO: 342 K/UL (ref 150–450)
PMV BLD AUTO: 9.8 FL (ref 8–13.5)
POTASSIUM SERPL-SCNC: 3.7 MMOL/L (ref 3.7–5.3)
PROT SERPL-MCNC: 7.1 G/DL (ref 6.6–8.7)
PROTEIN, POC: 100 MG/DL
RBC # BLD AUTO: 4.13 M/UL (ref 3.95–5.11)
SODIUM SERPL-SCNC: 142 MMOL/L (ref 136–145)
SPECIFIC GRAVITY, POC: 1.03
UROBILINOGEN, POC: 1 MG/DL
WBC OTHER # BLD: 9.1 K/UL (ref 3.5–11)

## 2025-05-23 PROCEDURE — 80053 COMPREHEN METABOLIC PANEL: CPT

## 2025-05-23 PROCEDURE — 99215 OFFICE O/P EST HI 40 MIN: CPT | Performed by: NURSE PRACTITIONER

## 2025-05-23 PROCEDURE — 36415 COLL VENOUS BLD VENIPUNCTURE: CPT

## 2025-05-23 PROCEDURE — 81003 URINALYSIS AUTO W/O SCOPE: CPT | Performed by: NURSE PRACTITIONER

## 2025-05-23 PROCEDURE — 1036F TOBACCO NON-USER: CPT | Performed by: NURSE PRACTITIONER

## 2025-05-23 PROCEDURE — G8417 CALC BMI ABV UP PARAM F/U: HCPCS | Performed by: NURSE PRACTITIONER

## 2025-05-23 PROCEDURE — 74176 CT ABD & PELVIS W/O CONTRAST: CPT

## 2025-05-23 PROCEDURE — 3017F COLORECTAL CA SCREEN DOC REV: CPT | Performed by: NURSE PRACTITIONER

## 2025-05-23 PROCEDURE — 85025 COMPLETE CBC W/AUTO DIFF WBC: CPT

## 2025-05-23 PROCEDURE — G8428 CUR MEDS NOT DOCUMENT: HCPCS | Performed by: NURSE PRACTITIONER

## 2025-05-23 RX ORDER — IBUPROFEN 800 MG/1
800 TABLET, FILM COATED ORAL EVERY 8 HOURS PRN
Qty: 30 TABLET | Refills: 0 | Status: SHIPPED | OUTPATIENT
Start: 2025-05-23 | End: 2025-06-02

## 2025-05-23 RX ORDER — CEPHALEXIN 500 MG/1
500 CAPSULE ORAL 3 TIMES DAILY
Qty: 30 CAPSULE | Refills: 0 | Status: SHIPPED | OUTPATIENT
Start: 2025-05-23 | End: 2025-06-02

## 2025-05-23 ASSESSMENT — ENCOUNTER SYMPTOMS
ABDOMINAL PAIN: 0
BLOOD IN STOOL: 0
FACIAL SWELLING: 0
NAUSEA: 0
DIARRHEA: 0
VOMITING: 0
ABDOMINAL DISTENTION: 0
BACK PAIN: 1
RESPIRATORY NEGATIVE: 1

## 2025-05-23 NOTE — PROGRESS NOTES
[facility], [facility]  [address]  [address]  [facility] [address]  Dept: [phone]  Dept Fax: [phone]    Keri Scruggs is a 55 y.o. female who presents to the urgent care today for her medical conditions/complaints as notedbelow.  Keri Scruggs is c/o of Hematuria (This afternoon with low grade fever and abd pressure. )      HPI:     55-year-old female presents for possible urinary tract infection/kidney stone  Sudden onset rt flank pain 1.5 hrs pta, temp up to 99. Elevated from 97's for her. No n/v. Urinated and looked like red cranberry juice, no clots.   Flank pain resolved right now  Pain 7/10 at start of sx, currently 4/10 - bladder pressure/pain  Hx kidney stones, has had lithotripsy in past, follows with Urology, Dr Louise  Hx hysterectomy  Prior to today, and currently has no urinary frequency or urgency or hesitancy.  Abrupt onset  No change in stream or diff urinating        Hematuria  This is a new problem. The current episode started today. The problem is unchanged. She describes the hematuria as gross hematuria. The hematuria occurs throughout her entire urinary stream. She reports no clotting in her urine stream. Her pain is at a severity of 4/10. The pain is mild. She describes her urine color as dark red. Irritative symptoms do not include frequency, nocturia or urgency. Obstructive symptoms do not include dribbling, incomplete emptying, an intermittent stream, a slower stream, straining or a weak stream. Associated symptoms include bladder pain and flank pain (rt). Pertinent negatives include no abdominal pain, bone pain, chills, dysuria, facial swelling, genital pain, hematospermia, hesitancy, inability to urinate, nausea, urinary retention, vomiting or weight loss. Her past medical history is significant for kidney stones.       Past Medical History:   Diagnosis Date    Cough     phlegm

## 2025-05-25 ENCOUNTER — RESULTS FOLLOW-UP (OUTPATIENT)
Dept: FAMILY MEDICINE CLINIC | Age: 55
End: 2025-05-25

## 2025-05-25 LAB
MICROORGANISM SPEC CULT: ABNORMAL
SPECIMEN DESCRIPTION: ABNORMAL

## 2025-05-28 DIAGNOSIS — B96.89 ACUTE BACTERIAL SINUSITIS: ICD-10-CM

## 2025-05-28 DIAGNOSIS — J01.90 ACUTE BACTERIAL SINUSITIS: ICD-10-CM

## 2025-05-28 NOTE — TELEPHONE ENCOUNTER
Keri Scruggs is calling to request a refill on the following medication(s):    Medication Request:  Requested Prescriptions     Pending Prescriptions Disp Refills    azelastine (ASTELIN) 0.1 % nasal spray 120 mL 1     Si sprays by Nasal route 2 times daily Use in each nostril as directed       Last Visit Date (If Applicable):  2025    Next Visit Date:    10/27/2025

## 2025-05-29 RX ORDER — AZELASTINE 1 MG/ML
2 SPRAY, METERED NASAL 2 TIMES DAILY
Qty: 120 ML | Refills: 1 | Status: SHIPPED | OUTPATIENT
Start: 2025-05-29

## 2025-05-30 ENCOUNTER — HOSPITAL ENCOUNTER (OUTPATIENT)
Dept: GENERAL RADIOLOGY | Age: 55
Discharge: HOME | End: 2025-05-30
Payer: COMMERCIAL

## 2025-05-30 ENCOUNTER — TRANSCRIBE ORDERS (OUTPATIENT)
Dept: ADMISSION | Age: 55
End: 2025-05-30

## 2025-05-30 DIAGNOSIS — N20.0 KIDNEY STONE: ICD-10-CM

## 2025-05-30 DIAGNOSIS — N20.0 KIDNEY STONE: Primary | ICD-10-CM

## 2025-05-30 PROCEDURE — 74018 RADEX ABDOMEN 1 VIEW: CPT

## 2025-06-04 DIAGNOSIS — E78.2 MIXED HYPERLIPIDEMIA: Chronic | ICD-10-CM

## 2025-06-04 RX ORDER — SIMVASTATIN 40 MG
40 TABLET ORAL
Qty: 90 TABLET | Refills: 3 | Status: SHIPPED | OUTPATIENT
Start: 2025-06-04

## 2025-06-04 NOTE — TELEPHONE ENCOUNTER
Keri Scruggs is calling to request a refill on the following medication(s):    Medication Request:  Requested Prescriptions     Pending Prescriptions Disp Refills    simvastatin (ZOCOR) 40 MG tablet 90 tablet 3     Sig: Take 1 tablet by mouth daily (with breakfast)       Last Visit Date (If Applicable):  2/24/2025    Next Visit Date:    6/10/2025

## 2025-06-05 ENCOUNTER — HOSPITAL ENCOUNTER (EMERGENCY)
Age: 55
Discharge: HOME | End: 2025-06-05
Attending: EMERGENCY MEDICINE
Payer: COMMERCIAL

## 2025-06-05 ENCOUNTER — APPOINTMENT (OUTPATIENT)
Dept: CT IMAGING | Age: 55
End: 2025-06-05
Payer: COMMERCIAL

## 2025-06-05 VITALS
TEMPERATURE: 98.2 F | OXYGEN SATURATION: 99 % | HEIGHT: 63 IN | HEART RATE: 73 BPM | BODY MASS INDEX: 31.01 KG/M2 | SYSTOLIC BLOOD PRESSURE: 139 MMHG | DIASTOLIC BLOOD PRESSURE: 70 MMHG | WEIGHT: 175 LBS | RESPIRATION RATE: 17 BRPM

## 2025-06-05 DIAGNOSIS — R42 DIZZINESS: Primary | ICD-10-CM

## 2025-06-05 LAB
ALBUMIN SERPL-MCNC: 4.4 G/DL (ref 3.5–5.2)
ALBUMIN/GLOB SERPL: 1.7 {RATIO} (ref 1–2.5)
ALP SERPL-CCNC: 59 U/L (ref 35–104)
ALT SERPL-CCNC: 25 U/L (ref 10–35)
ANION GAP SERPL CALCULATED.3IONS-SCNC: 13 MMOL/L (ref 9–16)
AST SERPL-CCNC: 27 U/L (ref 10–35)
BASOPHILS # BLD: 0.06 K/UL (ref 0–0.2)
BASOPHILS NFR BLD: 1 % (ref 0–2)
BILIRUB SERPL-MCNC: 0.3 MG/DL (ref 0–1.2)
BUN SERPL-MCNC: 12 MG/DL (ref 6–20)
CALCIUM SERPL-MCNC: 9.6 MG/DL (ref 8.6–10.4)
CHLORIDE SERPL-SCNC: 104 MMOL/L (ref 98–107)
CO2 SERPL-SCNC: 24 MMOL/L (ref 20–31)
CREAT SERPL-MCNC: 0.9 MG/DL (ref 0.5–0.9)
EOSINOPHIL # BLD: 0.11 K/UL (ref 0–0.44)
EOSINOPHILS RELATIVE PERCENT: 2 % (ref 1–4)
ERYTHROCYTE [DISTWIDTH] IN BLOOD BY AUTOMATED COUNT: 12.5 % (ref 11.8–14.4)
GFR, ESTIMATED: 73 ML/MIN/1.73M2
GLUCOSE SERPL-MCNC: 95 MG/DL (ref 74–99)
HCT VFR BLD AUTO: 40.6 % (ref 36.3–47.1)
HGB BLD-MCNC: 14.1 G/DL (ref 11.9–15.1)
IMM GRANULOCYTES # BLD AUTO: 0 K/UL (ref 0–0.3)
IMM GRANULOCYTES NFR BLD: 0 %
LYMPHOCYTES NFR BLD: 0.72 K/UL (ref 1.1–3.7)
LYMPHOCYTES RELATIVE PERCENT: 13 % (ref 24–43)
MCH RBC QN AUTO: 32.7 PG (ref 25.2–33.5)
MCHC RBC AUTO-ENTMCNC: 34.7 G/DL (ref 28.4–34.8)
MCV RBC AUTO: 94.2 FL (ref 82.6–102.9)
MONOCYTES NFR BLD: 0.28 K/UL (ref 0.1–1.2)
MONOCYTES NFR BLD: 5 % (ref 3–12)
NEUTROPHILS NFR BLD: 79 % (ref 36–65)
NEUTS SEG NFR BLD: 4.33 K/UL (ref 1.5–8.1)
NRBC BLD-RTO: 0 PER 100 WBC
PLATELET # BLD AUTO: 310 K/UL (ref 138–453)
PMV BLD AUTO: 10.5 FL (ref 8.1–13.5)
POTASSIUM SERPL-SCNC: 4 MMOL/L (ref 3.7–5.3)
PROT SERPL-MCNC: 7 G/DL (ref 6.6–8.7)
RBC # BLD AUTO: 4.31 M/UL (ref 3.95–5.11)
SODIUM SERPL-SCNC: 140 MMOL/L (ref 136–145)
TROPONIN I SERPL HS-MCNC: 7 NG/L (ref 0–14)
TSH SERPL DL<=0.05 MIU/L-ACNC: 0.11 UIU/ML (ref 0.27–4.2)
WBC OTHER # BLD: 5.5 K/UL (ref 3.5–11.3)

## 2025-06-05 PROCEDURE — 80053 COMPREHEN METABOLIC PANEL: CPT

## 2025-06-05 PROCEDURE — 70450 CT HEAD/BRAIN W/O DYE: CPT

## 2025-06-05 PROCEDURE — 6370000000 HC RX 637 (ALT 250 FOR IP): Performed by: EMERGENCY MEDICINE

## 2025-06-05 PROCEDURE — 6360000004 HC RX CONTRAST MEDICATION: Performed by: EMERGENCY MEDICINE

## 2025-06-05 PROCEDURE — 84443 ASSAY THYROID STIM HORMONE: CPT

## 2025-06-05 PROCEDURE — 99285 EMERGENCY DEPT VISIT HI MDM: CPT

## 2025-06-05 PROCEDURE — 85025 COMPLETE CBC W/AUTO DIFF WBC: CPT

## 2025-06-05 PROCEDURE — 84484 ASSAY OF TROPONIN QUANT: CPT

## 2025-06-05 PROCEDURE — 2500000003 HC RX 250 WO HCPCS: Performed by: EMERGENCY MEDICINE

## 2025-06-05 PROCEDURE — 70498 CT ANGIOGRAPHY NECK: CPT

## 2025-06-05 PROCEDURE — 93005 ELECTROCARDIOGRAM TRACING: CPT | Performed by: EMERGENCY MEDICINE

## 2025-06-05 PROCEDURE — 2580000003 HC RX 258: Performed by: EMERGENCY MEDICINE

## 2025-06-05 RX ORDER — MECLIZINE HYDROCHLORIDE 25 MG/1
25 TABLET ORAL 3 TIMES DAILY PRN
Qty: 30 TABLET | Refills: 0 | Status: SHIPPED | OUTPATIENT
Start: 2025-06-05 | End: 2025-06-15

## 2025-06-05 RX ORDER — SODIUM CHLORIDE 0.9 % (FLUSH) 0.9 %
10 SYRINGE (ML) INJECTION ONCE
Status: COMPLETED | OUTPATIENT
Start: 2025-06-05 | End: 2025-06-05

## 2025-06-05 RX ORDER — MECLIZINE HCL 12.5 MG 12.5 MG/1
25 TABLET ORAL ONCE
Status: COMPLETED | OUTPATIENT
Start: 2025-06-05 | End: 2025-06-05

## 2025-06-05 RX ORDER — IOPAMIDOL 755 MG/ML
75 INJECTION, SOLUTION INTRAVASCULAR
Status: COMPLETED | OUTPATIENT
Start: 2025-06-05 | End: 2025-06-05

## 2025-06-05 RX ORDER — 0.9 % SODIUM CHLORIDE 0.9 %
80 INTRAVENOUS SOLUTION INTRAVENOUS ONCE
Status: COMPLETED | OUTPATIENT
Start: 2025-06-05 | End: 2025-06-05

## 2025-06-05 RX ADMIN — MECLIZINE 25 MG: 12.5 TABLET ORAL at 08:18

## 2025-06-05 RX ADMIN — SODIUM CHLORIDE 80 ML: 9 INJECTION, SOLUTION INTRAVENOUS at 09:51

## 2025-06-05 RX ADMIN — SODIUM CHLORIDE, PRESERVATIVE FREE 10 ML: 5 INJECTION INTRAVENOUS at 09:51

## 2025-06-05 RX ADMIN — IOPAMIDOL 75 ML: 755 INJECTION, SOLUTION INTRAVENOUS at 09:51

## 2025-06-05 ASSESSMENT — PAIN - FUNCTIONAL ASSESSMENT: PAIN_FUNCTIONAL_ASSESSMENT: NONE - DENIES PAIN

## 2025-06-05 NOTE — ED PROVIDER NOTES
EMERGENCY DEPARTMENT ENCOUNTER    Pt Name: Keri Scruggs  MRN: [id number]  Birthdate 1970  Date of evaluation: 25  CHIEF COMPLAINT       Chief Complaint   Patient presents with    Dizziness     HISTORY OF PRESENT ILLNESS   55-year-old female presents emergency room for dizziness.  Symptoms started around 3 AM this morning.  Patient got up to use the restroom.  She felt a little off balance and dizzy.  She ended up going back to bed then this morning dizziness persisted.  Symptoms seem worse when laying flat or certain position changes.  Symptoms seem to come in waves.  Patient reports she is overall able to ambulate without significant difficulty.  She has never experienced symptoms like this before.  She reported her vision seemed a little bit off earlier as well.             REVIEW OF SYSTEMS     Review of Systems   Eyes:  Positive for visual disturbance.   Neurological:  Positive for dizziness.     PASTMEDICAL HISTORY     Past Medical History:   Diagnosis Date    Cough     phlegm    Hyperlipidemia     Hypertension     Hypothyroid     Menorrhagia     Seasonal allergies     Sinus headache     Uterine fibroid     Wears glasses     Wellness examination     PCP Dr Jaswant Vidales     Past Problem List  Patient Active Problem List   Diagnosis Code    Hypertension I10    Hyperlipemia E78.5    Anxiety F41.9    Depression F32.A    Hypothyroidism E03.9    Sleep trouble G47.9    Irritable bowel syndrome K58.9    Nephrolithiasis N20.0    Onychomycosis B35.1    Breast cancer screening by mammogram Z12.31    Sinus tachycardia R00.0     SURGICAL HISTORY       Past Surgical History:   Procedure Laterality Date     SECTION  1994    DILATION AND CURETTAGE OF UTERUS N/A 2019    DILATATION AND CURETTAGE HYSTEROSCOPY performed by Ivan Dejesus MD at [facility] OR    EXTRACORPOREAL SHOCK WAVE LITHOTRIPSY  2023    ESWL EXTRACORPOREAL SHOCK WAVE LITHOTRIPSY    HYSTERECTOMY, VAGINAL N/A 2020

## 2025-06-05 NOTE — ED TRIAGE NOTES
Mode of arrival (squad #, walk in, police, etc) : walk in        Chief complaint(s): dizziness        Arrival Note (brief scenario, treatment PTA, etc).: states room is spinning, nausea no vomiting

## 2025-06-05 NOTE — DISCHARGE INSTRUCTIONS
As we discussed if dizziness becomes more of an issue for you you may need to see neurology as an outpatient.  Meclizine can be used for dizziness up to 3 times a day.  Make sure you are generally getting enough fluids throughout the day.  If symptoms become severe I recommend reevaluation in the emergency room.

## 2025-06-07 LAB
EKG ATRIAL RATE: 77 BPM
EKG P AXIS: 36 DEGREES
EKG P-R INTERVAL: 168 MS
EKG Q-T INTERVAL: 378 MS
EKG QRS DURATION: 88 MS
EKG QTC CALCULATION (BAZETT): 427 MS
EKG R AXIS: 40 DEGREES
EKG T AXIS: 35 DEGREES
EKG VENTRICULAR RATE: 77 BPM

## 2025-06-07 PROCEDURE — 93010 ELECTROCARDIOGRAM REPORT: CPT | Performed by: INTERNAL MEDICINE

## 2025-06-10 ENCOUNTER — OFFICE VISIT (OUTPATIENT)
Age: 55
End: 2025-06-10
Payer: COMMERCIAL

## 2025-06-10 VITALS
DIASTOLIC BLOOD PRESSURE: 70 MMHG | OXYGEN SATURATION: 96 % | TEMPERATURE: 97.4 F | HEART RATE: 94 BPM | BODY MASS INDEX: 31 KG/M2 | WEIGHT: 175 LBS | SYSTOLIC BLOOD PRESSURE: 110 MMHG

## 2025-06-10 DIAGNOSIS — I10 PRIMARY HYPERTENSION: Chronic | ICD-10-CM

## 2025-06-10 DIAGNOSIS — N94.9 ADNEXAL CYST: Primary | ICD-10-CM

## 2025-06-10 DIAGNOSIS — E03.9 HYPOTHYROIDISM, UNSPECIFIED TYPE: ICD-10-CM

## 2025-06-10 PROCEDURE — 3074F SYST BP LT 130 MM HG: CPT

## 2025-06-10 PROCEDURE — 3017F COLORECTAL CA SCREEN DOC REV: CPT

## 2025-06-10 PROCEDURE — G8417 CALC BMI ABV UP PARAM F/U: HCPCS

## 2025-06-10 PROCEDURE — 1036F TOBACCO NON-USER: CPT

## 2025-06-10 PROCEDURE — G8427 DOCREV CUR MEDS BY ELIG CLIN: HCPCS

## 2025-06-10 PROCEDURE — 99214 OFFICE O/P EST MOD 30 MIN: CPT

## 2025-06-10 PROCEDURE — 3078F DIAST BP <80 MM HG: CPT

## 2025-06-10 RX ORDER — LEVOTHYROXINE SODIUM 88 UG/1
88 TABLET ORAL DAILY
Qty: 90 TABLET | Refills: 1 | Status: SHIPPED | OUTPATIENT
Start: 2025-06-10

## 2025-06-10 RX ORDER — CEPHALEXIN 500 MG/1
1 CAPSULE ORAL
COMMUNITY
End: 2025-06-10

## 2025-06-10 ASSESSMENT — ENCOUNTER SYMPTOMS
SHORTNESS OF BREATH: 0
DIARRHEA: 1
WHEEZING: 0

## 2025-06-10 NOTE — ASSESSMENT & PLAN NOTE
Orders:    CT ABDOMEN PELVIS WO CONTRAST Additional Contrast? None; Future    levothyroxine (SYNTHROID) 88 MCG tablet; Take 1 tablet by mouth daily

## 2025-06-10 NOTE — PROGRESS NOTES
Keri Scruggs (:  1970) is a 55 y.o. female,Established patient, here for evaluation of the following chief complaint(s):  Follow-Up from Hospital    This is 65 years old female with Hx of hypothyroidism on Synthroid, AUB s/p hysterectomy  following with gynecologist, vitamin D deficiency on supplements, and anxiety on as needed Xanax only while on airplane, insomnia on trazodone, IBS with diarrhea adjustment, hypercholesterolemia on Zocor, essential HTN on Prinzide  History of Present Illness  The patient presents for evaluation of kidney stone, ovarian cyst, vertigo, and hypothyroidism.    Kidney Stone  - She reports a recent episode of passing a kidney stone, which was accompanied by significant discomfort and hematuria.  - She suspects the stone was passed as her urine returned to its normal color by Friday night and the discomfort subsided.  - A subsequent CT scan on 2025 revealed the presence of another stone in the right kidney.  - She has a history of kidney stones and describes the associated pain as similar to menstrual cramps.  - She has been under the care of a urologist since last week, who is currently monitoring the size of the stone.    Ovarian Cyst  - During the same CT scan, an incidental finding of a cyst on her right ovary was noted.  - She has a history of hysterectomy but retains her ovaries.  - She is uncertain about the need for continued monitoring of the cyst.  - She is considering scheduling an appointment with her nurse practitioner at the gynecology office.    Vertigo  - She experienced an episode of vertigo on 2025, characterized by a sensation of the room spinning and difficulty focusing her eyes on the alarm clock.  - A head CT scan with and without contrast was performed, which did not reveal any abnormalities.  - She was prescribed meclizine, which effectively resolved her symptoms.  - She has not experienced any further episodes of dizziness since

## 2025-07-01 DIAGNOSIS — I10 PRIMARY HYPERTENSION: Chronic | ICD-10-CM

## 2025-07-01 RX ORDER — LISINOPRIL AND HYDROCHLOROTHIAZIDE 12.5; 2 MG/1; MG/1
1 TABLET ORAL DAILY
Qty: 90 TABLET | Refills: 3 | Status: SHIPPED | OUTPATIENT
Start: 2025-07-01

## 2025-07-01 NOTE — TELEPHONE ENCOUNTER
Keri Scruggs is calling to request a refill on the following medication(s):    Medication Request:  Requested Prescriptions     Pending Prescriptions Disp Refills    lisinopril-hydroCHLOROthiazide (PRINZIDE;ZESTORETIC) 20-12.5 MG per tablet [Pharmacy Med Name: LISINOPRIL/HYDROCHLOROTHIAZIDE TABS 20/12.5MG] 90 tablet 3     Sig: TAKE 1 TABLET DAILY       Last Visit Date (If Applicable):  2/24/2025    Next Visit Date:    10/27/2025

## 2025-07-22 ENCOUNTER — HOSPITAL ENCOUNTER (OUTPATIENT)
Dept: GENERAL RADIOLOGY | Facility: CLINIC | Age: 55
Discharge: HOME | End: 2025-07-24
Payer: COMMERCIAL

## 2025-07-22 ENCOUNTER — OFFICE VISIT (OUTPATIENT)
Age: 55
End: 2025-07-22

## 2025-07-22 VITALS — BODY MASS INDEX: 31.64 KG/M2 | DIASTOLIC BLOOD PRESSURE: 86 MMHG | WEIGHT: 178.6 LBS | SYSTOLIC BLOOD PRESSURE: 124 MMHG

## 2025-07-22 DIAGNOSIS — G56.22 LESION OF LEFT ULNAR NERVE: ICD-10-CM

## 2025-07-22 DIAGNOSIS — M25.512 ACUTE PAIN OF LEFT SHOULDER: Primary | ICD-10-CM

## 2025-07-22 PROCEDURE — 73030 X-RAY EXAM OF SHOULDER: CPT

## 2025-07-22 NOTE — PROGRESS NOTES
Keri Scruggs (:  1970) is a 55 y.o. female,Established patient, here for evaluation of the following chief complaint(s):  Other (Left arm pain, hurt it at the beginning of  )    Assessment & Plan  Diagnoses and all orders for this visit:  Acute pain of left shoulder  -     XR SHOULDER LEFT (MIN 2 VIEWS); Future  Lesion of left ulnar nerve  -     DME Order for (Specify) as OP     1. Left arm pain  - Likely due to ulnar nerve impingement and muscle spasm, as evidenced by physical examination  - Shoulder joints appear normal, but noticeable muscle tension  - Order x-ray of the shoulder to rule out underlying issues  - Provide brace for support  - Advise ibuprofen 600 mg three times daily until brace arrives  - Use Ace wrap for support if brace is unavailable  - Recommend home exercises twice daily  - Consider formal physical therapy if x-ray results are normal and brace does not provide relief within a week  - May add muscle relaxant to treatment regimen if necessary          No follow-ups on file.       Subjective   HPI  History of Present Illness  The patient presents for evaluation of left arm pain.    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  - The injury occurred when she was standing on a chair and using her shoulder and arm to push up a window.  - She has been managing the pain with ibuprofen 800 mg as needed, typically twice daily, but did not take any today.  - She took one yesterday.  - She also took ibuprofen 600 mg last night.  - She has not been wearing a brace or wrap for support.  - She has tried Voltaren gel, but it did not provide relief.  - She has an Ace wrap at home.  - She is right-handed and experiences mild tingling in her hand.  - She does not report any weakness or numbness.  - She finds it painful to lift her purse and to remove her shirt over her head.  - She has been performing stretches

## 2025-08-27 ENCOUNTER — HOSPITAL ENCOUNTER (OUTPATIENT)
Age: 55
Setting detail: SPECIMEN
Discharge: HOME | End: 2025-08-27

## 2025-08-27 ENCOUNTER — OFFICE VISIT (OUTPATIENT)
Dept: FAMILY MEDICINE CLINIC | Age: 55
End: 2025-08-27
Payer: COMMERCIAL

## 2025-08-27 VITALS
DIASTOLIC BLOOD PRESSURE: 84 MMHG | TEMPERATURE: 97.5 F | HEART RATE: 104 BPM | SYSTOLIC BLOOD PRESSURE: 128 MMHG | OXYGEN SATURATION: 100 %

## 2025-08-27 DIAGNOSIS — R35.0 URINARY FREQUENCY: ICD-10-CM

## 2025-08-27 DIAGNOSIS — R39.9 UTI SYMPTOMS: Primary | ICD-10-CM

## 2025-08-27 LAB
BILIRUBIN, POC: ABNORMAL
BLOOD URINE, POC: ABNORMAL
CLARITY, POC: CLEAR
COLOR, POC: YELLOW
GLUCOSE URINE, POC: ABNORMAL MG/DL
KETONES, POC: ABNORMAL MG/DL
LEUKOCYTE EST, POC: ABNORMAL
NITRITE, POC: ABNORMAL
PH, POC: 5.5
PROTEIN, POC: ABNORMAL MG/DL
SPECIFIC GRAVITY, POC: 1.02
UROBILINOGEN, POC: 0.2 MG/DL

## 2025-08-27 PROCEDURE — G8427 DOCREV CUR MEDS BY ELIG CLIN: HCPCS | Performed by: NURSE PRACTITIONER

## 2025-08-27 PROCEDURE — G8417 CALC BMI ABV UP PARAM F/U: HCPCS | Performed by: NURSE PRACTITIONER

## 2025-08-27 PROCEDURE — 3017F COLORECTAL CA SCREEN DOC REV: CPT | Performed by: NURSE PRACTITIONER

## 2025-08-27 PROCEDURE — 1036F TOBACCO NON-USER: CPT | Performed by: NURSE PRACTITIONER

## 2025-08-27 PROCEDURE — 99214 OFFICE O/P EST MOD 30 MIN: CPT | Performed by: NURSE PRACTITIONER

## 2025-08-27 PROCEDURE — 81003 URINALYSIS AUTO W/O SCOPE: CPT | Performed by: NURSE PRACTITIONER

## 2025-08-27 PROCEDURE — 3077F SYST BP >= 140 MM HG: CPT | Performed by: NURSE PRACTITIONER

## 2025-08-27 PROCEDURE — 3079F DIAST BP 80-89 MM HG: CPT | Performed by: NURSE PRACTITIONER

## 2025-08-27 RX ORDER — NITROFURANTOIN 25; 75 MG/1; MG/1
100 CAPSULE ORAL 2 TIMES DAILY
Qty: 14 CAPSULE | Refills: 0 | Status: SHIPPED | OUTPATIENT
Start: 2025-08-27 | End: 2025-09-03

## 2025-08-27 ASSESSMENT — ENCOUNTER SYMPTOMS
DIARRHEA: 0
VOMITING: 0
BACK PAIN: 0
NAUSEA: 0

## 2025-08-28 ENCOUNTER — TELEPHONE (OUTPATIENT)
Dept: FAMILY MEDICINE CLINIC | Age: 55
End: 2025-08-28

## 2025-08-29 LAB
MICROORGANISM SPEC CULT: NORMAL
SPECIMEN DESCRIPTION: NORMAL

## (undated) DEVICE — Device